# Patient Record
Sex: FEMALE | Race: WHITE | NOT HISPANIC OR LATINO | Employment: FULL TIME | URBAN - METROPOLITAN AREA
[De-identification: names, ages, dates, MRNs, and addresses within clinical notes are randomized per-mention and may not be internally consistent; named-entity substitution may affect disease eponyms.]

---

## 2019-07-24 ENCOUNTER — TRANSFERRED RECORDS (OUTPATIENT)
Dept: HEALTH INFORMATION MANAGEMENT | Facility: CLINIC | Age: 54
End: 2019-07-24

## 2019-07-24 LAB
HPV ABSTRACT: NORMAL
PAP-ABSTRACT: NORMAL

## 2019-10-24 ENCOUNTER — TRANSFERRED RECORDS (OUTPATIENT)
Dept: HEALTH INFORMATION MANAGEMENT | Facility: CLINIC | Age: 54
End: 2019-10-24

## 2019-10-24 LAB — COLOGUARD-ABSTRACT: NEGATIVE

## 2020-01-25 ENCOUNTER — TRANSFERRED RECORDS (OUTPATIENT)
Dept: HEALTH INFORMATION MANAGEMENT | Facility: CLINIC | Age: 55
End: 2020-01-25

## 2020-01-28 ENCOUNTER — TRANSFERRED RECORDS (OUTPATIENT)
Dept: HEALTH INFORMATION MANAGEMENT | Facility: CLINIC | Age: 55
End: 2020-01-28

## 2020-06-25 ENCOUNTER — OFFICE VISIT (OUTPATIENT)
Dept: FAMILY MEDICINE | Facility: CLINIC | Age: 55
End: 2020-06-25
Payer: COMMERCIAL

## 2020-06-25 ENCOUNTER — ANCILLARY PROCEDURE (OUTPATIENT)
Dept: GENERAL RADIOLOGY | Facility: CLINIC | Age: 55
End: 2020-06-25
Attending: FAMILY MEDICINE
Payer: COMMERCIAL

## 2020-06-25 VITALS
WEIGHT: 190.2 LBS | RESPIRATION RATE: 16 BRPM | BODY MASS INDEX: 31.69 KG/M2 | SYSTOLIC BLOOD PRESSURE: 122 MMHG | OXYGEN SATURATION: 95 % | TEMPERATURE: 98.6 F | HEART RATE: 65 BPM | HEIGHT: 65 IN | DIASTOLIC BLOOD PRESSURE: 82 MMHG

## 2020-06-25 DIAGNOSIS — R20.0 ARM NUMBNESS: Primary | ICD-10-CM

## 2020-06-25 DIAGNOSIS — R20.0 BILATERAL HAND NUMBNESS: ICD-10-CM

## 2020-06-25 PROBLEM — E03.8 OTHER SPECIFIED HYPOTHYROIDISM: Status: ACTIVE | Noted: 2020-06-25

## 2020-06-25 PROBLEM — Z13.6 CARDIOVASCULAR SCREENING; LDL GOAL LESS THAN 160: Status: ACTIVE | Noted: 2020-06-25

## 2020-06-25 PROCEDURE — 99203 OFFICE O/P NEW LOW 30 MIN: CPT | Performed by: FAMILY MEDICINE

## 2020-06-25 PROCEDURE — 72040 X-RAY EXAM NECK SPINE 2-3 VW: CPT | Mod: FY

## 2020-06-25 RX ORDER — LEVOTHYROXINE SODIUM 125 UG/1
112 TABLET ORAL DAILY
COMMUNITY
End: 2020-07-02 | Stop reason: DRUGHIGH

## 2020-06-25 ASSESSMENT — MIFFLIN-ST. JEOR: SCORE: 1464.65

## 2020-06-25 NOTE — PROGRESS NOTES
Subjective     Azul Dsouza is a 54 year old female who presents to clinic today for the following health issues:    HPI   Musculoskeletal problem/pain      Duration: x 2 months     Description  Location: left arm    Intensity:  moderate    Accompanying signs and symptoms: radiation of pain to hands- tingling and numbess    History  Previous similar problem: no   Previous evaluation:  none    Precipitating or alleviating factors:  Trauma or overuse: no   Aggravating factors include: while bending the back forward position will cause the hands to go numb.     Therapies tried and outcome: ice, Ibuprofen and NSAID - Naproxen        See under ROS    Patient Active Problem List   Diagnosis     Other specified hypothyroidism     CARDIOVASCULAR SCREENING; LDL GOAL LESS THAN 160       Current Outpatient Medications   Medication Sig Dispense Refill     levothyroxine (SYNTHROID/LEVOTHROID) 125 MCG tablet Take 125 mcg by mouth daily             Reviewed and updated as needed this visit by Provider         Review of Systems   CONSTITUTIONAL:NEGATIVE for fever, chills, change in weight  CV: NEGATIVE for chest pain, palpitations or peripheral edema  MUSCULOSKELETAL: see below  NEURO: see below  PSYCHIATRIC: NEGATIVE for changes in mood or affect        Thinking back or neck problem.  Leaning forward, left hand goes numb.  She first noted with weeding. Thought she might be pulling too hard. Started weeding with other hand.   Notes that there can be a shooting pain from elbow to hands; not more proximally.   A couple weeks ago, she went on a bike ride and notes was quite uncomfortable. Had to sit up to have it go away.   Is Icing and using Ibuprofen.    Did ice elbow, then neck after someone mentioned it might be from that.  Denies neck pain. Denies back pain.    Since end of March/early April.  Couple weeks ago was the bike ride.     She does note some relief with doing some exercises for tennis elbow.    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      Objective    /82 (BP Location: Right arm, Cuff Size: Adult Large)   Pulse 65   Temp 98.6  F (37  C) (Oral)   Resp 16   Ht 1.645 m (5' 4.75\")   Wt 86.3 kg (190 lb 3.2 oz)   SpO2 95%   BMI 31.90 kg/m    Body mass index is 31.9 kg/m .  Physical Exam   GENERAL APPEARANCE: alert and no distress  NECK: no adenopathy  MS: MS:left Elbow: Tender to palpation at lateral aspect. No pain with pronation, and with wrist extension.  She demonstrated leaning forward like tying shoe; that her hand goes numb. Did have her flex neck forward while seated, and she notes same thing happened. This does not happen with neck extension.  Radial pulse easily palpated.  PSYCH: mentation appears normal and affect normal/bright    Cervical x-ray:  FINDINGS: There is reversal of the cervical lordosis. There is loss of  disc space height at C4-5 and C5-6. Mild degenerative change in the  facet joints..                                               IMPRESSION: Multilevel degenerative changes.        Assessment & Plan     1. Arm numbness  Suspected that it may come from neck; she can demonstrate that it comes on with forward flexion of neck. However, not with extension, which would probably be more the position when she was biking.     She only has symptoms from the elbows distally.   Will refer for assistance in diagnosis and management. Discussed some options. Consider Medical spine or Neurology.  With some elbow pain; I do think she may have some lateral epicondylitis; but overall this is currently mild.   - XR Cervical Spine 2/3 Views; Future  - Orthopedic & Spine  Referral; Future    2. Bilateral hand numbness  As above.  - XR Cervical Spine 2/3 Views; Future  - Orthopedic & Spine  Referral; Future           Return in about 1 week (around 7/2/2020) for medical spine specialist..    Gladys Ramey MD, MD  [facility]          "

## 2020-07-02 ENCOUNTER — OFFICE VISIT (OUTPATIENT)
Dept: ORTHOPEDICS | Facility: CLINIC | Age: 55
End: 2020-07-02
Attending: FAMILY MEDICINE
Payer: COMMERCIAL

## 2020-07-02 VITALS
HEIGHT: 65 IN | DIASTOLIC BLOOD PRESSURE: 88 MMHG | BODY MASS INDEX: 31.65 KG/M2 | SYSTOLIC BLOOD PRESSURE: 128 MMHG | WEIGHT: 190 LBS

## 2020-07-02 DIAGNOSIS — M47.812 FACET ARTHROPATHY, CERVICAL: Primary | ICD-10-CM

## 2020-07-02 DIAGNOSIS — M50.30 DDD (DEGENERATIVE DISC DISEASE), CERVICAL: ICD-10-CM

## 2020-07-02 DIAGNOSIS — M54.12 LEFT CERVICAL RADICULOPATHY: ICD-10-CM

## 2020-07-02 PROCEDURE — 99203 OFFICE O/P NEW LOW 30 MIN: CPT | Performed by: FAMILY MEDICINE

## 2020-07-02 RX ORDER — LEVOTHYROXINE SODIUM 112 UG/1
112 TABLET ORAL DAILY
COMMUNITY
End: 2020-07-30

## 2020-07-02 ASSESSMENT — MIFFLIN-ST. JEOR: SCORE: 1463.74

## 2020-07-02 NOTE — PATIENT INSTRUCTIONS
1. Facet arthropathy, cervical    2. DDD (degenerative disc disease), cervical    3. Left cervical radiculopathy      Pain is coming from your neck - cervical radiculopathy  Reviewed xray - reversal of the normal curve, advanced disc narrowing and facet arthritis  Physical therapy: [facility] - [phone]    Follow-up if not improving after 3-4 therapy sessions

## 2020-07-02 NOTE — PROGRESS NOTES
ASSESSMENT & PLAN    1. Facet arthropathy, cervical    2. DDD (degenerative disc disease), cervical    3. Left cervical radiculopathy      Seen in consultation for acute left arm numbness related to cervical radiculopathy  Reviewed xray - reversal of the normal curve, advanced disc narrowing and facet arthritis  Physical therapy: [facility] - [phone]    Follow-up if not improving after 3-4 therapy sessions  -----    SUBJECTIVE  Azul Dsouza is a/an 54 year old Right handed female who is seen in consultation at the request of  Gladys Ramey M.D. for evaluation of numbness in left forearm. The patient is seen by themselves.    Onset: 2 month(s) ago. Reports insidious onset without acute precipitating event. Patient was doing a lot of weeding in her garden around the time that the pain began and thought it would resolve on its own but it has not.  Location of Pain: numbness / tingling in left elbow radiating to left hand; occasional pain in left lateral elbow; patient also notes some left superior shoulder pain   Rating of Pain at worst: 3/10 (discomfort)  Rating of Pain Currently: 0/10  Worsened by: leaning forward to tie shoes, sleeping on right or left side  Better with: laying on back, changing positions  Treatments tried: rest/activity avoidance, ice, ibuprofen, other medications: naproxen and previous imaging (xray 6/25/20 cervical spine)  Quality: numbness, tingling  Associated symptoms: numbness and tingling; denies weakness  Orthopedic history: NO  Relevant surgical history: NO  Patient Social History: works as a teacher    Patient's past medical, surgical, social, and family histories were reviewed today and no pertinent history related to patient's presenting problem.    REVIEW OF SYSTEMS:  10 point ROS is negative other than symptoms noted above in HPI, Past Medical History or as stated below  Constitutional: NEGATIVE for fever, chills, change in weight  Skin: NEGATIVE for  "worrisome rashes, moles or lesions  GI/: NEGATIVE for bowel or bladder changes  Neuro: NEGATIVE for weakness, dizziness or paresthesias    OBJECTIVE:  /88   Ht 1.645 m (5' 4.75\")   Wt 86.2 kg (190 lb)   BMI 31.86 kg/m     General: healthy, alert and in no distress  HEENT: no scleral icterus or conjunctival erythema  Skin: no suspicious lesions or rash. No jaundice.  CV: regular rhythm by palpation  Resp: normal respiratory effort without conversational dyspnea   Psych: normal mood and affect  Gait: normal steady gait with appropriate coordination and balance  Neuro: with cervical extension induces ulnar sided forearm and hand numbness, also medial forearm arm and middle deltoid.  Once that subsides she has normal light touch sensory exam of the bilateral upper extremities.  Normal 2 pt discrimination.  MSK:  CERVICAL SPINE  Inspection:    rounded shoulders, forward head posture  Palpation:    Tender about the medial border of scapula. Otherwise remainder of the landmarks and nontender.  Range of Motion:     Flexion full    Extension limited by pain and induces left cervical radic / numbness into left forearm  Strength:    biceps (C6) 5/5, wrist extension (C6) 5/5, triceps (C7) 5/5, wrist flexion (C7) 5/5, finger flexion (C8) 5/5, Normal pinch strength  Special Tests:    Positive: Spurling's (left), Tinel (Carpal Tunnel)    Negative: Tinel (Guyon's and Cubital)    Independent visualization of the below image:    Results for orders placed or performed in visit on 06/25/20   XR Cervical Spine 2/3 Views    Narrative    CERVICAL SPINE 2-3 VIEWS 6/25/2020 12:05 PM     HISTORY: numbness from elbows to hands left > right with bending neck  forward.; Bilateral hand numbness    COMPARISON: None.    FINDINGS: There is reversal of the cervical lordosis. There is loss of  disc space height at C4-5 and C5-6. Mild degenerative change in the  facet joints..      Impression    IMPRESSION: Multilevel degenerative " changes.    MD Fabian CATES, DO [facility]

## 2020-07-02 NOTE — LETTER
7/2/2020         RE: Azul Dsouza  [address] [facility] [address]        Dear Colleague,    Thank you for referring your patient, Azul Dsouza, to the [facility]. Please see a copy of my visit note below.    ASSESSMENT & PLAN    1. Facet arthropathy, cervical    2. DDD (degenerative disc disease), cervical    3. Left cervical radiculopathy      Seen in consultation for acute left arm numbness related to cervical radiculopathy  Reviewed xray - reversal of the normal curve, advanced disc narrowing and facet arthritis  Physical therapy: [facility] - [phone]    Follow-up if not improving after 3-4 therapy sessions  -----    SUBJECTIVE  Azul Dsouza is a/an 54 year old Right handed female who is seen in consultation at the request of  Gladys Ramey M.D. for evaluation of numbness in left forearm. The patient is seen by themselves.    Onset: 2 month(s) ago. Reports insidious onset without acute precipitating event. Patient was doing a lot of weeding in her garden around the time that the pain began and thought it would resolve on its own but it has not.  Location of Pain: numbness / tingling in left elbow radiating to left hand; occasional pain in left lateral elbow; patient also notes some left superior shoulder pain   Rating of Pain at worst: 3/10 (discomfort)  Rating of Pain Currently: 0/10  Worsened by: leaning forward to tie shoes, sleeping on right or left side  Better with: laying on back, changing positions  Treatments tried: rest/activity avoidance, ice, ibuprofen, other medications: naproxen and previous imaging (xray 6/25/20 cervical spine)  Quality: numbness, tingling  Associated symptoms: numbness and tingling; denies weakness  Orthopedic history: NO  Relevant surgical history: NO  Patient Social History: works as a teacher    Patient's past medical, surgical, social, and family histories were reviewed today and no pertinent history  "related to patient's presenting problem.    REVIEW OF SYSTEMS:  10 point ROS is negative other than symptoms noted above in HPI, Past Medical History or as stated below  Constitutional: NEGATIVE for fever, chills, change in weight  Skin: NEGATIVE for worrisome rashes, moles or lesions  GI/: NEGATIVE for bowel or bladder changes  Neuro: NEGATIVE for weakness, dizziness or paresthesias    OBJECTIVE:  /88   Ht 1.645 m (5' 4.75\")   Wt 86.2 kg (190 lb)   BMI 31.86 kg/m     General: healthy, alert and in no distress  HEENT: no scleral icterus or conjunctival erythema  Skin: no suspicious lesions or rash. No jaundice.  CV: regular rhythm by palpation  Resp: normal respiratory effort without conversational dyspnea   Psych: normal mood and affect  Gait: normal steady gait with appropriate coordination and balance  Neuro: with cervical extension induces ulnar sided forearm and hand numbness, also medial forearm arm and middle deltoid.  Once that subsides she has normal light touch sensory exam of the bilateral upper extremities.  Normal 2 pt discrimination.  MSK:  CERVICAL SPINE  Inspection:    rounded shoulders, forward head posture  Palpation:    Tender about the medial border of scapula. Otherwise remainder of the landmarks and nontender.  Range of Motion:     Flexion full    Extension limited by pain and induces left cervical radic / numbness into left forearm  Strength:    biceps (C6) 5/5, wrist extension (C6) 5/5, triceps (C7) 5/5, wrist flexion (C7) 5/5, finger flexion (C8) 5/5, Normal pinch strength  Special Tests:    Positive: Spurling's (left), Tinel (Carpal Tunnel)    Negative: Tinel (Guyon's and Cubital)    Independent visualization of the below image:    Results for orders placed or performed in visit on 06/25/20   XR Cervical Spine 2/3 Views    Narrative    CERVICAL SPINE 2-3 VIEWS 6/25/2020 12:05 PM     HISTORY: numbness from elbows to hands left > right with bending neck  forward.; Bilateral hand " numbness    COMPARISON: None.    FINDINGS: There is reversal of the cervical lordosis. There is loss of  disc space height at C4-5 and C5-6. Mild degenerative change in the  facet joints..      Impression    IMPRESSION: Multilevel degenerative changes.    MD Fabian CATES, DO [facility]        Again, thank you for allowing me to participate in the care of your patient.        Sincerely,        Fabian Hsieh, DO

## 2020-07-14 ENCOUNTER — THERAPY VISIT (OUTPATIENT)
Dept: PHYSICAL THERAPY | Facility: CLINIC | Age: 55
End: 2020-07-14
Attending: FAMILY MEDICINE
Payer: COMMERCIAL

## 2020-07-14 DIAGNOSIS — M50.30 DDD (DEGENERATIVE DISC DISEASE), CERVICAL: ICD-10-CM

## 2020-07-14 DIAGNOSIS — M54.12 CERVICAL RADICULOPATHY: ICD-10-CM

## 2020-07-14 DIAGNOSIS — M54.12 LEFT CERVICAL RADICULOPATHY: ICD-10-CM

## 2020-07-14 DIAGNOSIS — M47.812 FACET ARTHROPATHY, CERVICAL: ICD-10-CM

## 2020-07-14 PROCEDURE — 97530 THERAPEUTIC ACTIVITIES: CPT | Mod: GP | Performed by: PHYSICAL THERAPIST

## 2020-07-14 PROCEDURE — 97162 PT EVAL MOD COMPLEX 30 MIN: CPT | Mod: GP | Performed by: PHYSICAL THERAPIST

## 2020-07-14 PROCEDURE — 97110 THERAPEUTIC EXERCISES: CPT | Mod: GP | Performed by: PHYSICAL THERAPIST

## 2020-07-14 NOTE — PROGRESS NOTES
"[facility] Initial Evaluation -- Cervical    Evaluation Date: July 14, 2020  Azul Dsouza is a 54 year old female with a cervical condition.   Referral: Dr. Hsieh  Work mechanical stresses: teacher   Employment status: full time  Leisure mechanical stresses: gardening  Functional disability score (NDI):  See flow sheet  VAS score (0-10): 4/10  Patient goals/expectations:  I want to never have my arm fall asleep, ride my bike, garden, sleep on my sides vs back only.\"    HISTORY:    Present symptoms:  L scap, L arm numbness.  Pain quality (sharp/shooting/stabbing/aching/burning/cramping):   aching.  Paresthesia (yes/no):  Yes, bilateral hands, L UE    Present since (onset date): March 2020.     Symptoms (improving/unchanging/worsening):  worsening.    Symptoms commenced as a result of: no apparent   Condition occurred in the following environment:  home    Symptoms at onset (neck/arm/forearm/headache): L arm,   Constant symptoms (neck/arm/forearm/headache):   Intermittent symptoms (neck/arm/forearm/headache): L arm, neck    Symptoms are made worse with the following: Always Bending, Sometimes Turning, Sometimes Lying and time of day - Always as the day progresses   Symptoms are made better with the following: Always Lying, time of day - Always AM and ice, [place], sitting back or tall.    Disturbed sleep (yes/no): only if rolling onto side  Number of pillows: 1  Sleeping postures (prone/sup/side R/L): supine    Previous episodes (0/1-5/6-10/11+): 0 Year of first episode: 2020, March    Previous history: none previous  Previous treatments: none    Specific Questions: (as reported by the patient)  Dizziness/Tinnitus/Nausea/Swallowing (pos/neg): neg  Gait/Upper Limbs (normal/abnormal): normal  Medications (nil/NSAIDS/anlag/steroids/anticoag/other):  NSAIDS and Other - Thyroid  Medical allergies:  Sulfa  General health (excellent/good/fair/poor):  excellent  Pertinent medical history:  Thyroid " problems  Imaging (None/Xray/MRI/Other):  xrays  Recent or major surgery (yes/no): no  Night pain (yes/no): no  Accidents (yes/no): none  Unexplained weight loss (yes/no): none  Barriers at home: none  Other red flags: none    EXAMINATION    Posture:   Sitting (good/fair/poor): good  Standing (good/fair/poor): good     Protruded head (yes/no): no    Wry Neck (right/left/nil):  nil  Relevant (yes/no):  none     Correction of posture(better/worse/no effect): no effect  Other observations:  none    Neurological:    Motor Deficit:  none   Reflexes:  intact  Sensory Deficit:  none   Dural signs:  +ULNTT    Movement Loss:   Parvez Mod Min Nil Pain   Protrusion    x    Flexion  x x  pdm neck   Retraction  x x  P L UE NT   Extension  x x  P L UE NT   Lateral flexion R  x      Lateral flexion L  x      Rotation R   x     Rotation L   x       Test Movements:   During: produces, abolishes, increases, decreases, no effect, centralizing, peripheralizing  After: better, worse, no better, no worse, no effect, centralized, peripheralized    Pretest symptoms sitting: painfree   Symptoms During Symptoms After ROM increased ROM decreased No Effect   PRO        Rep PRO        RET Produces  Peripheralising    No Worse         Rep RET Produces  Peripheralising    Worse      x   RET EXT        Rep RET EXT        Pretest symptoms lying:  painfree   Symptoms During Symptoms After ROM increased ROM decreased No Effect   RET NE   NE      Rep RET No Effect    No Effect    X, better L ULNTT     RET EXT        Rep RET EXT        If required, pretest symptoms sitting:     Symptoms During Symptoms After ROM increased ROM decreased No Effect   LF-R        Rep LF-R        LF-L        Rep LF-L        ROT-R        Rep ROT-R        ROT-L        Rep ROT-L        FLEX        Rep FLEX            Static Tests:   Protrusion:    Flexion:    Retraction:    Extension (sitting/prone/supine):      Other Tests:     Provisional Classification:  Derangement -  Asymmetrical, unilateral, symptoms below elbow    Principle of Management:  Education:  Centralization, therapeutic dose of exercise, traffic light    Equipment provided:  Lumbar roll  Mechanical therapy (Y/N):  Y   Extension principle:  Rep RET in supine x 10/2 hrs   Lateral principle:    Flexion principle:     Other:      ASSESSMENT/PLAN:    Patient is a 54 year old female with cervical complaints.    Patient has the following significant findings with corresponding treatment plan.                Diagnosis 1:  Cervical radiculopathy  Pain -  manual therapy, self management, education, directional preference exercise and home program  Decreased ROM/flexibility - manual therapy and therapeutic exercise  Decreased function - therapeutic activities  Impaired posture - neuro re-education    Therapy Evaluation Codes:   1) History comprised of:   Personal factors that impact the plan of care:      None.    Comorbidity factors that impact the plan of care are:      Overweight.     Medications impacting care: Anti-inflammatory.  2) Examination of Body Systems comprised of:   Body structures and functions that impact the plan of care:      Cervical spine.   Activity limitations that impact the plan of care are:      Bending, Cooking, Working and Sleeping.  3) Clinical presentation characteristics are:   Evolving/Changing.  4) Decision-Making    Moderate complexity using standardized patient assessment instrument and/or measureable assessment of functional outcome.  Cumulative Therapy Evaluation is: Moderate complexity.    Previous and current functional limitations:  (See Goal Flow Sheet for this information)    Short term and Long term goals: (See Goal Flow Sheet for this information)     Communication ability:  Patient appears to be able to clearly communicate and understand verbal and written communication and follow directions correctly.  Treatment Explanation - The following has been discussed with the patient:   RX  ordered/plan of care  Anticipated outcomes  Possible risks and side effects  This patient would benefit from PT intervention to resume normal activities.   Rehab potential is good.    Frequency:  1 X week, once daily  Duration:  for 6 weeks  Discharge Plan:  Achieve all LTG.  Independent in home treatment program.  Reach maximal therapeutic benefit.    Please refer to the daily flowsheet for treatment today, total treatment time and time spent performing 1:1 timed codes.

## 2020-07-21 ENCOUNTER — THERAPY VISIT (OUTPATIENT)
Dept: PHYSICAL THERAPY | Facility: CLINIC | Age: 55
End: 2020-07-21
Payer: COMMERCIAL

## 2020-07-21 DIAGNOSIS — M54.12 CERVICAL RADICULOPATHY: ICD-10-CM

## 2020-07-21 PROCEDURE — 97110 THERAPEUTIC EXERCISES: CPT | Mod: GP | Performed by: PHYSICAL THERAPIST

## 2020-07-21 PROCEDURE — 97140 MANUAL THERAPY 1/> REGIONS: CPT | Mod: GP | Performed by: PHYSICAL THERAPIST

## 2020-07-23 ENCOUNTER — THERAPY VISIT (OUTPATIENT)
Dept: PHYSICAL THERAPY | Facility: CLINIC | Age: 55
End: 2020-07-23
Payer: COMMERCIAL

## 2020-07-23 DIAGNOSIS — M54.12 CERVICAL RADICULOPATHY: ICD-10-CM

## 2020-07-23 PROCEDURE — 97110 THERAPEUTIC EXERCISES: CPT | Mod: GP | Performed by: PHYSICAL THERAPIST

## 2020-07-23 PROCEDURE — 97530 THERAPEUTIC ACTIVITIES: CPT | Mod: GP | Performed by: PHYSICAL THERAPIST

## 2020-07-27 ENCOUNTER — DOCUMENTATION ONLY (OUTPATIENT)
Dept: FAMILY MEDICINE | Facility: CLINIC | Age: 55
End: 2020-07-27

## 2020-07-27 NOTE — PROGRESS NOTES
Recd records from [facility] 07/27/20 and forwarded to Gladys Ramey for review and scanning

## 2020-07-28 NOTE — PROGRESS NOTES
"We did receive some records and they will be sent to abstraction  cologuard specimen negative 10/24/2019  Mammogram 10/24/2019 negative   Pap and HPV negative  7/24/2019  TSH 0.761  T4 1.18  7/24/2019    Azul Dsouza is a 54 year old female who is being evaluated via a billable telephone visit.      Telephone visit  [phone]  Previously at [facility]    The patient has been notified of following:     \"This telephone visit will be conducted via a call between you and your physician/provider. We have found that certain health care needs can be provided without the need for a physical exam.  This service lets us provide the care you need with a short phone conversation.  If a prescription is necessary we can send it directly to your pharmacy.  If lab work is needed we can place an order for that and you can then stop by our lab to have the test done at a later time.    Telephone visits are billed at different rates depending on your insurance coverage. During this emergency period, for some insurers they may be billed the same as an in-person visit.  Please reach out to your insurance provider with any questions.    If during the course of the call the physician/provider feels a telephone visit is not appropriate, you will not be charged for this service.\"    Patient has given verbal consent for Telephone visit?  Yes    What phone number would you like to be contacted at? [phone]    How would you like to obtain your AVS? Chaparrita Pack     Azul Dsouza is a 54 year old female who presents via phone visit today for the following health issues:    HPI    Hypothyroidism Follow-up    ( Pt states her previous provider has given her a 30 day extension on her thyroid medication and so she will be doing labs on Monday  afternoon in [place] and if you would like to wait for the labs to come back prior to refilling she is fine with that)    #7 of 12   Mother also on thyroid medications  On meds for 10 + " years.      Since last visit, patient describes the following symptoms: Weight stable, no hair loss, no skin changes, no constipation, no loose stools      How many servings of fruits and vegetables do you eat daily?  4 or more    On average, how many sweetened beverages do you drink each day (Examples: soda, juice, sweet tea, etc.  Do NOT count diet or artificially sweetened beverages)?   0    How many days per week do you exercise enough to make your heart beat faster? 5    How many minutes a day do you exercise enough to make your heart beat faster? 20 - 29    How many days per week do you miss taking your medication? 0           Patient Active Problem List   Diagnosis     Other specified hypothyroidism     CARDIOVASCULAR SCREENING; LDL GOAL LESS THAN 160     Cervical radiculopathy     Past Surgical History:   Procedure Laterality Date      SECTION      x2     LAPAROSCOPIC TUBAL LIGATION      No Comments Provided       Social History     Tobacco Use     Smoking status: Never Smoker     Smokeless tobacco: Never Used   Substance Use Topics     Alcohol use: Yes     Family History   Problem Relation Age of Onset     Family History Negative Mother         Good Health     Family History Negative Father         Good Health     Other - See Comments Father         depression     Other - See Comments Sister         depression     Other - See Comments Brother         Psychiatric illness,bipolar     Cancer Maternal Grandmother         Cancer,Skin cancer/ bone cancer.     Cancer Maternal Grandfather         Cancer,Lung cancer.     Diabetes Paternal Grandmother         Diabetes     Breast Cancer No family hx of         Cancer-breast     Colon Cancer No family hx of         Cancer-colon         Current Outpatient Medications   Medication Sig Dispense Refill     levothyroxine (SYNTHROID/LEVOTHROID) 112 MCG tablet Take 112 mcg by mouth daily       PULMICORT FLEXHALER 180 MCG/ACT inhaler Inhale 1 puff into the lungs 2  times daily as needed       Allergies   Allergen Reactions     Sulfa Drugs Hives     No lab results found.   BP Readings from Last 3 Encounters:   07/02/20 128/88   06/25/20 122/82    Wt Readings from Last 3 Encounters:   07/02/20 86.2 kg (190 lb)   06/25/20 86.3 kg (190 lb 3.2 oz)           Reviewed and updated as needed this visit by Provider  Tobacco  Allergies  Meds  Problems  Med Hx  Surg Hx  Fam Hx         Review of Systems   CONSTITUTIONAL: NEGATIVE for fever, chills, change in weight  ENT/MOUTH: NEGATIVE for ear, mouth and throat problems  RESP: NEGATIVE for significant cough or SOB  CV: NEGATIVE for chest pain, palpitations or peripheral edema  GI: NEGATIVE for nausea, abdominal pain, heartburn, or change in bowel habits  MUSCULOSKELETAL: NEGATIVE for significant arthralgias or myalgia  NEURO: NEGATIVE for weakness, dizziness or paresthesias  ENDOCRINE: thyroid, clinically euthyroid; due for labs  PSYCHIATRIC: NEGATIVE for changes in mood or affect       Objective   Reported vitals:  There were no vitals taken for this visit.   healthy, alert and no distress  PSYCH: Alert and oriented times 3; coherent speech, normal   rate and volume, able to articulate logical thoughts, able   to abstract reason, no tangential thoughts, no hallucinations   or delusions  Her affect is normal and pleasant  RESP: No cough, no audible wheezing, able to talk in full sentences  Remainder of exam unable to be completed due to telephone visits    Diagnostic Test Results:  Labs reviewed in Epic        Assessment/Plan:    (E03.9) Acquired hypothyroidism  (primary encounter diagnosis)  Comment: 10 + years; strong family history of hypothyroid; mother- thyroid; one of 7 ( of 12 siblings) on thyroid meds; feeling well on Levothyroxine 112 mcg per day.   Clinically euthyroid; labs today, consider dose adjustment if labs warrant  Sent Rx. Will need new Rx if labs warrant a change;   Plan: levothyroxine (SYNTHROID/LEVOTHROID) 112  MCG         tablet, TSH with free T4 reflex, Comprehensive         metabolic panel          (Z13.6) CARDIOVASCULAR SCREENING; LDL GOAL LESS THAN 130  Comment: screening.   Plan: Lipid panel reflex to direct LDL Fasting              Return in about 1 year (around 7/30/2021) for Thyroid; physical and review HCM in the interim. .      Phone call duration:  17 minutes    Kim Clancy MD  Internal Medicine  electronically signed

## 2020-07-30 ENCOUNTER — VIRTUAL VISIT (OUTPATIENT)
Dept: FAMILY MEDICINE | Facility: CLINIC | Age: 55
End: 2020-07-30
Payer: COMMERCIAL

## 2020-07-30 DIAGNOSIS — Z13.6 CARDIOVASCULAR SCREENING; LDL GOAL LESS THAN 130: ICD-10-CM

## 2020-07-30 DIAGNOSIS — E03.9 ACQUIRED HYPOTHYROIDISM: Primary | ICD-10-CM

## 2020-07-30 PROCEDURE — 99213 OFFICE O/P EST LOW 20 MIN: CPT | Mod: TEL | Performed by: INTERNAL MEDICINE

## 2020-07-30 RX ORDER — LEVOTHYROXINE SODIUM 112 UG/1
112 TABLET ORAL DAILY
Qty: 90 TABLET | Refills: 3 | Status: SHIPPED | OUTPATIENT
Start: 2020-07-30 | End: 2021-08-03

## 2020-07-30 RX ORDER — BUDESONIDE 180 UG/1
1 AEROSOL, POWDER RESPIRATORY (INHALATION) 2 TIMES DAILY PRN
COMMUNITY
Start: 2020-03-18

## 2020-08-05 ENCOUNTER — THERAPY VISIT (OUTPATIENT)
Dept: PHYSICAL THERAPY | Facility: CLINIC | Age: 55
End: 2020-08-05
Payer: COMMERCIAL

## 2020-08-05 DIAGNOSIS — Z13.6 CARDIOVASCULAR SCREENING; LDL GOAL LESS THAN 130: ICD-10-CM

## 2020-08-05 DIAGNOSIS — M54.12 CERVICAL RADICULOPATHY: ICD-10-CM

## 2020-08-05 DIAGNOSIS — E03.9 ACQUIRED HYPOTHYROIDISM: ICD-10-CM

## 2020-08-05 PROCEDURE — 80053 COMPREHEN METABOLIC PANEL: CPT | Performed by: INTERNAL MEDICINE

## 2020-08-05 PROCEDURE — 80061 LIPID PANEL: CPT | Performed by: INTERNAL MEDICINE

## 2020-08-05 PROCEDURE — 36415 COLL VENOUS BLD VENIPUNCTURE: CPT | Performed by: INTERNAL MEDICINE

## 2020-08-05 PROCEDURE — 97140 MANUAL THERAPY 1/> REGIONS: CPT | Mod: GP | Performed by: PHYSICAL THERAPIST

## 2020-08-05 PROCEDURE — 84443 ASSAY THYROID STIM HORMONE: CPT | Performed by: INTERNAL MEDICINE

## 2020-08-05 NOTE — PROGRESS NOTES
DISCHARGE REPORT    Progress reporting period is from 7-14-20 to 8-5-20.       SUBJECTIVE  Subjective changes noted by patient:  .  Subjective: Worse having increased L arm pain, less able to find a comfortable position to alleviate arm pain now.    Current pain level is 6/10  .     Previous pain level was  4/10 Initial Pain level: 4/10.   Changes in function:  None  Adverse reaction to treatment or activity: treatment - increasing L arm pain    OBJECTIVE  Changes noted in objective findings:  The objective findings below are from DOS 8-5-20.  Objective: No DP established, needs further invasive measures; referred back to provider.      ASSESSMENT/PLAN  Updated problem list and treatment plan: Diagnosis 1:  Cervical radiculopathy  Pain -  manual therapy, self management, education, directional preference exercise and home program  Decreased ROM/flexibility - manual therapy and therapeutic exercise  Decreased joint mobility - manual therapy and therapeutic exercise  Decreased function - therapeutic activities  Impaired posture - neuro re-education  STG/LTGs have been met or progress has been made towards goals:  None  Assessment of Progress: The patient's condition is unchanged.  The patient's condition has exacerbated.  Self Management Plans:  Patient is independent in a home treatment program.  Patient is independent in self management of symptoms.  I have re-evaluated this patient and find that the nature, scope, duration and intensity of the therapy is appropriate for the medical condition of the patient.  Azul continues to require the following intervention to meet STG and LTG's:  PT intervention is no longer required to meet STG/LTG.    Recommendations:  This patient would benefit from further evaluation.    Please refer to the daily flowsheet for treatment today, total treatment time and time spent performing 1:1 timed codes.

## 2020-08-06 LAB
ALBUMIN SERPL-MCNC: 4.1 G/DL (ref 3.4–5)
ALP SERPL-CCNC: 72 U/L (ref 40–150)
ALT SERPL W P-5'-P-CCNC: 12 U/L (ref 0–50)
ANION GAP SERPL CALCULATED.3IONS-SCNC: 5 MMOL/L (ref 3–14)
AST SERPL W P-5'-P-CCNC: 17 U/L (ref 0–45)
BILIRUB SERPL-MCNC: 0.6 MG/DL (ref 0.2–1.3)
BUN SERPL-MCNC: 10 MG/DL (ref 7–30)
CALCIUM SERPL-MCNC: 8.5 MG/DL (ref 8.5–10.1)
CHLORIDE SERPL-SCNC: 106 MMOL/L (ref 94–109)
CHOLEST SERPL-MCNC: 146 MG/DL
CO2 SERPL-SCNC: 28 MMOL/L (ref 20–32)
CREAT SERPL-MCNC: 0.8 MG/DL (ref 0.52–1.04)
GFR SERPL CREATININE-BSD FRML MDRD: 83 ML/MIN/{1.73_M2}
GLUCOSE SERPL-MCNC: 93 MG/DL (ref 70–99)
HDLC SERPL-MCNC: 68 MG/DL
LDLC SERPL CALC-MCNC: 66 MG/DL
NONHDLC SERPL-MCNC: 78 MG/DL
POTASSIUM SERPL-SCNC: 4 MMOL/L (ref 3.4–5.3)
PROT SERPL-MCNC: 7.8 G/DL (ref 6.8–8.8)
SODIUM SERPL-SCNC: 139 MMOL/L (ref 133–144)
TRIGL SERPL-MCNC: 60 MG/DL
TSH SERPL DL<=0.005 MIU/L-ACNC: 2.15 MU/L (ref 0.4–4)

## 2020-08-17 ENCOUNTER — VIRTUAL VISIT (OUTPATIENT)
Dept: ORTHOPEDICS | Facility: CLINIC | Age: 55
End: 2020-08-17
Payer: COMMERCIAL

## 2020-08-17 DIAGNOSIS — M47.812 FACET ARTHROPATHY, CERVICAL: Primary | ICD-10-CM

## 2020-08-17 DIAGNOSIS — M54.12 LEFT CERVICAL RADICULOPATHY: ICD-10-CM

## 2020-08-17 DIAGNOSIS — M50.30 DDD (DEGENERATIVE DISC DISEASE), CERVICAL: ICD-10-CM

## 2020-08-17 PROCEDURE — 99214 OFFICE O/P EST MOD 30 MIN: CPT | Mod: TEL | Performed by: FAMILY MEDICINE

## 2020-08-17 NOTE — LETTER
"    8/17/2020         RE: Azul Dsouza  [address] [facility] [address]        Dear Colleague,    Thank you for referring your patient, Azul Dsouza, to the [facility]. Please see a copy of my visit note below.    Azul Dsouza is a 54 year old female who is being evaluated via a billable telephone visit.      The patient has been notified of following:     \"This telephone visit will be conducted via a call between you and your physician/provider. We have found that certain health care needs can be provided without the need for a physical exam.  This service lets us provide the care you need with a short phone conversation.  If a prescription is necessary we can send it directly to your pharmacy.  If lab work is needed we can place an order for that and you can then stop by our lab to have the test done at a later time.    Telephone visits are billed at different rates depending on your insurance coverage. During this emergency period, for some insurers they may be billed the same as an in-person visit.  Please reach out to your insurance provider with any questions.    If during the course of the call the physician/provider feels a telephone visit is not appropriate, you will not be charged for this service.\"    Patient has given verbal consent for Telephone visit?  Yes    What phone number would you like to be contacted at? [phone]    How would you like to obtain your AVS? Chaparrita    Phone call duration: 15 minutes    Azul Dsouza is following up for left forearm numbness.   Since their last visit reports minimal improvement with structured physical therapy. They indicate that their current pain level is 3/10. They have tried ibuprofen and physical therapy (4 visits).      I have reviewed and updated the patient's Past Medical History, Social History, Family History and Medication List.    ALLERGIES  Sulfa drugs    REVIEW OF SYSTEMS:  10 point ROS is negative " other than symptoms noted above in HPI, Past Medical History or as stated below  Constitutional: NEGATIVE for fever, chills, change in weight  Skin: NEGATIVE for worrisome rashes, moles or lesions  GI/: NEGATIVE for bowel or bladder changes  Neuro: NEGATIVE for weakness, dizziness or paresthesias    Assessment/Plan:    1. Facet arthropathy, cervical    2. DDD (degenerative disc disease), cervical    3. Left cervical radiculopathy      Discussion with MDT therapist - patient has not had sustained improvement with structure spine based therapy.  MRI of your neck has been ordered.Schedule with [facility] ([phone]). Once you know the date of your MRI, please call my office and schedule a follow-up visit for 2 days after that.  Xrays obtained and reviewed: yes  Tried NSAIDs: yes  6 weeks of physician directed physical therapy completed within the last 4 months: no, 4 weeks completed and don't anticipate any improvement with additional therapy. Has demonstrated no directional preference with therapy.    Fabian Hsieh DO, CAQSM  [facility]          Again, thank you for allowing me to participate in the care of your patient.        Sincerely,        Fabian Hsieh DO

## 2020-08-17 NOTE — PROGRESS NOTES
"Azul Dsouza is a 54 year old female who is being evaluated via a billable telephone visit.      The patient has been notified of following:     \"This telephone visit will be conducted via a call between you and your physician/provider. We have found that certain health care needs can be provided without the need for a physical exam.  This service lets us provide the care you need with a short phone conversation.  If a prescription is necessary we can send it directly to your pharmacy.  If lab work is needed we can place an order for that and you can then stop by our lab to have the test done at a later time.    Telephone visits are billed at different rates depending on your insurance coverage. During this emergency period, for some insurers they may be billed the same as an in-person visit.  Please reach out to your insurance provider with any questions.    If during the course of the call the physician/provider feels a telephone visit is not appropriate, you will not be charged for this service.\"    Patient has given verbal consent for Telephone visit?  Yes    What phone number would you like to be contacted at? [phone]    How would you like to obtain your AVS? Chaparrita    Phone call duration: 15 minutes    Azul Dsouza is following up for left forearm numbness.   Since their last visit reports minimal improvement with structured physical therapy. They indicate that their current pain level is 3/10. They have tried ibuprofen and physical therapy (4 visits).      I have reviewed and updated the patient's Past Medical History, Social History, Family History and Medication List.    ALLERGIES  Sulfa drugs    REVIEW OF SYSTEMS:  10 point ROS is negative other than symptoms noted above in HPI, Past Medical History or as stated below  Constitutional: NEGATIVE for fever, chills, change in weight  Skin: NEGATIVE for worrisome rashes, moles or lesions  GI/: NEGATIVE for bowel or bladder changes  Neuro: " NEGATIVE for weakness, dizziness or paresthesias    Assessment/Plan:    1. Facet arthropathy, cervical    2. DDD (degenerative disc disease), cervical    3. Left cervical radiculopathy      Discussion with MDT therapist - patient has not had sustained improvement with structure spine based therapy.  MRI of your neck has been ordered.Schedule with [facility] ([phone]). Once you know the date of your MRI, please call my office and schedule a follow-up visit for 2 days after that.  Xrays obtained and reviewed: yes  Tried NSAIDs: yes  6 weeks of physician directed physical therapy completed within the last 4 months: no, 4 weeks completed and don't anticipate any improvement with additional therapy. Has demonstrated no directional preference with therapy.    Fabian Hsieh DO, CAQSM  [facility]

## 2020-08-17 NOTE — PATIENT INSTRUCTIONS
1. Facet arthropathy, cervical    2. DDD (degenerative disc disease), cervical    3. Left cervical radiculopathy      MRI of your neck has been ordered.Schedule with [facility] ([phone]). Once you know the date of your MRI, please call my office and schedule a follow-up visit for 2 days after that.  Continue with Ibuprofen

## 2020-09-03 ENCOUNTER — HOSPITAL ENCOUNTER (OUTPATIENT)
Dept: MRI IMAGING | Facility: CLINIC | Age: 55
Discharge: HOME | End: 2020-09-03
Attending: FAMILY MEDICINE | Admitting: FAMILY MEDICINE
Payer: COMMERCIAL

## 2020-09-03 DIAGNOSIS — M47.812 FACET ARTHROPATHY, CERVICAL: ICD-10-CM

## 2020-09-03 DIAGNOSIS — M50.30 DDD (DEGENERATIVE DISC DISEASE), CERVICAL: ICD-10-CM

## 2020-09-03 DIAGNOSIS — M54.12 LEFT CERVICAL RADICULOPATHY: ICD-10-CM

## 2020-09-03 PROCEDURE — 72141 MRI NECK SPINE W/O DYE: CPT

## 2020-09-05 ENCOUNTER — VIRTUAL VISIT (OUTPATIENT)
Dept: ORTHOPEDICS | Facility: CLINIC | Age: 55
End: 2020-09-05
Payer: COMMERCIAL

## 2020-09-05 DIAGNOSIS — M50.20 HERNIATED DISC, CERVICAL: ICD-10-CM

## 2020-09-05 DIAGNOSIS — M48.02 FORAMINAL STENOSIS OF CERVICAL REGION: ICD-10-CM

## 2020-09-05 DIAGNOSIS — M54.12 LEFT CERVICAL RADICULOPATHY: ICD-10-CM

## 2020-09-05 DIAGNOSIS — M47.812 FACET ARTHROPATHY, CERVICAL: ICD-10-CM

## 2020-09-05 DIAGNOSIS — R90.89 ABNORMAL MRI, SPINAL CORD: Primary | ICD-10-CM

## 2020-09-05 DIAGNOSIS — M48.00 CENTRAL SPINAL STENOSIS: ICD-10-CM

## 2020-09-05 DIAGNOSIS — M50.30 DDD (DEGENERATIVE DISC DISEASE), CERVICAL: ICD-10-CM

## 2020-09-05 PROCEDURE — 99214 OFFICE O/P EST MOD 30 MIN: CPT | Mod: TEL | Performed by: FAMILY MEDICINE

## 2020-09-05 NOTE — LETTER
"    9/5/2020         RE: Azul Dsouza  [address] [facility] [address]        Dear Colleague,    Thank you for referring your patient, Azul Dsouza, to the [facility]. Please see a copy of my visit note below.    Azul Dsouza is a 54 year old female who is being evaluated via a billable telephone visit.      The patient has been notified of following:     \"This telephone visit will be conducted via a call between you and your physician/provider. We have found that certain health care needs can be provided without the need for a physical exam.  This service lets us provide the care you need with a short phone conversation.  If a prescription is necessary we can send it directly to your pharmacy.  If lab work is needed we can place an order for that and you can then stop by our lab to have the test done at a later time.    Telephone visits are billed at different rates depending on your insurance coverage. During this emergency period, for some insurers they may be billed the same as an in-person visit.  Please reach out to your insurance provider with any questions.    If during the course of the call the physician/provider feels a telephone visit is not appropriate, you will not be charged for this service.\"    Patient has given verbal consent for Telephone visit?  Yes    What phone number would you like to be contacted at? [phone]    How would you like to obtain your AVS? Chaparrita    Phone call duration: 10 minutes    Azul Dsouza is following up for left forearm numbness and review of MRI results.    I have reviewed and updated the patient's Past Medical History, Social History, Family History and Medication List.    ALLERGIES  Sulfa drugs    REVIEW OF SYSTEMS:  10 point ROS is negative other than symptoms noted above in HPI, Past Medical History or as stated below  Constitutional: NEGATIVE for fever, chills, change in weight  Skin: NEGATIVE for worrisome " rashes, moles or lesions  GI/: NEGATIVE for bowel or bladder changes  Neuro: NEGATIVE for weakness, dizziness or paresthesias    Imaging  MRI OF THE CERVICAL SPINE WITHOUT CONTRAST  9/3/2020 6:36 PM     COMPARISON: None.     HISTORY: Assess for leftward disc. Facet arthropathy, cervical. DDD  (degenerative disc disease), cervical. Left cervical radiculopathy.     TECHNIQUE: Multiplanar, multisequence MRI images of the cervical spine  were acquired without intravenous contrast.     FINDINGS: There is normal alignment of the cervical vertebrae;  however, there is reversal of normal cervical lordosis centered at the  C4-C5 level. Vertebral body heights of the cervical spine are normal.  Marrow signal throughout the cervical vertebrae is within normal  limits. There is no evidence for fracture or pathologic bony lesion of  the cervical spine.      There is loss of disc height, disc desiccation and posterior disc  bulging to varying degrees at all levels of the cervical spine.       There is abnormal T2 signal hyperintensity in the left lateral aspect  of the cervical spinal cord from the lower C3 level to the level of  the C3-C4 disc that is nonspecific. This signal abnormality could be  due to demyelinating disease but could also represent previous cord  trauma or infection. Clinical correlation recommended. The cervical  spinal cord is otherwise normal in contour and signal intensity.     C2-C3: There is a small posterior central disc herniation  (protrusion). Mild facet arthropathy bilaterally. There is no spinal  canal or neural foraminal stenosis at this level.     C3-C4: There is a minimal posterior broad-based disc osteophyte  complex. Mild facet arthropathy bilaterally. There is no spinal canal  or neural foraminal stenosis at this level.     C4-C5: There is facet arthropathy bilaterally, uncinate hypertrophy  bilaterally and a posterior broad-based disc-osteophyte complex.  Minimal spinal canal narrowing.  Moderate right foraminal stenosis.  Mild left foraminal narrowing.     C5-C6: There is facet arthropathy bilaterally, uncinate hypertrophy  bilaterally and a posterior broad-based disc-osteophyte complex.  Moderate spinal canal stenosis. Moderate bilateral neural foraminal  stenosis.     C6-C7: There is facet arthropathy bilaterally, uncinate hypertrophy  bilaterally and a posterior broad-based disc-osteophyte complex. Mild  spinal canal narrowing. Moderate left foraminal stenosis. Mild right  foraminal narrowing.     C7-T1: Minimal loss of disc height and disc desiccation. Mild facet  arthropathy bilaterally. There is no spinal canal or neural foraminal  stenosis at this level.                                                                      IMPRESSION:  1. Diffuse degenerative changes of the cervical spine as detailed  above.  2. Focus of abnormal T2 signal hyperintensity in the left lateral  aspect of the cervical spinal cord at the lower C3 and C3-C4 level  that is of uncertain significance. Differential diagnosis includes  demyelinating disease, previous infection or previous trauma. While  spinal cord malignancy is considered much less likely, tumor cannot be  completely excluded. Postcontrast MRI images of the cervical spine  recommended to exclude active pathologic process.  3. Moderate spinal canal stenosis at the C5-C6 level. No other  significant spinal canal narrowing of the cervical spine.  4. Moderate to severe neural foraminal stenosis on the right at C4-C5,  bilaterally at C5-C6 and on the left at C6-C7.     NICK GALLEGOS MD    Assessment/Plan:  Patient Instructions     1. Abnormal MRI, spinal cord    2. Facet arthropathy, cervical    3. DDD (degenerative disc disease), cervical    4. Left cervical radiculopathy    5. Herniated disc, cervical    6. Central spinal stenosis    7. Foraminal stenosis of cervical region      Reviewed MRI which shows narrowing in the central canal and around the  nerves This explains the numbness  The MRI also shows a small area of increased signal in the spinal cord that requires additional imaging to further understand  MRI with contrast of your neck has been ordered. Schedule with  [facility] ([phone]).    Let's keep your current follow-up visit on 9/10 and will review results of this additional MRI at that time.    Fabian Hsieh DO, CAM  [facility]      Again, thank you for allowing me to participate in the care of your patient.        Sincerely,        Fabian Hsieh DO

## 2020-09-05 NOTE — PATIENT INSTRUCTIONS
1. Abnormal MRI, spinal cord    2. Facet arthropathy, cervical    3. DDD (degenerative disc disease), cervical    4. Left cervical radiculopathy    5. Herniated disc, cervical    6. Central spinal stenosis    7. Foraminal stenosis of cervical region      Reviewed MRI which shows narrowing in the central canal and around the nerves that feed your arm. This explains the numbness you're having  The MRI also shows a small area of increased signal in your spinal cord that requires additional imaging to further understand  MRI with contrast of your neck has been ordered. Schedule with  [facility] ([phone]).    Let's keep your current follow-up visit on 9/10 and will review results of this additional MRI at that time.

## 2020-09-05 NOTE — PROGRESS NOTES
"Azul Dsouza is a 54 year old female who is being evaluated via a billable telephone visit.      The patient has been notified of following:     \"This telephone visit will be conducted via a call between you and your physician/provider. We have found that certain health care needs can be provided without the need for a physical exam.  This service lets us provide the care you need with a short phone conversation.  If a prescription is necessary we can send it directly to your pharmacy.  If lab work is needed we can place an order for that and you can then stop by our lab to have the test done at a later time.    Telephone visits are billed at different rates depending on your insurance coverage. During this emergency period, for some insurers they may be billed the same as an in-person visit.  Please reach out to your insurance provider with any questions.    If during the course of the call the physician/provider feels a telephone visit is not appropriate, you will not be charged for this service.\"    Patient has given verbal consent for Telephone visit?  Yes    What phone number would you like to be contacted at? [phone]    [facility]    Phone call duration: 10 minutes    Azul Dsouza is following up for left forearm numbness and review of MRI results.    I have reviewed and updated the patient's Past Medical History, Social History, Family History and Medication List.    ALLERGIES  Sulfa drugs    REVIEW OF SYSTEMS:  10 point ROS is negative other than symptoms noted above in HPI, Past Medical History or as stated below  Constitutional: NEGATIVE for fever, chills, change in weight  Skin: NEGATIVE for worrisome rashes, moles or lesions  GI/: NEGATIVE for bowel or bladder changes  Neuro: NEGATIVE for weakness, dizziness or paresthesias    Imaging  MRI OF THE CERVICAL SPINE WITHOUT CONTRAST  9/3/2020 6:36 PM     COMPARISON: None.     HISTORY: Assess for leftward " disc. Facet arthropathy, cervical. DDD  (degenerative disc disease), cervical. Left cervical radiculopathy.     TECHNIQUE: Multiplanar, multisequence MRI images of the cervical spine  were acquired without intravenous contrast.     FINDINGS: There is normal alignment of the cervical vertebrae;  however, there is reversal of normal cervical lordosis centered at the  C4-C5 level. Vertebral body heights of the cervical spine are normal.  Marrow signal throughout the cervical vertebrae is within normal  limits. There is no evidence for fracture or pathologic bony lesion of  the cervical spine.      There is loss of disc height, disc desiccation and posterior disc  bulging to varying degrees at all levels of the cervical spine.       There is abnormal T2 signal hyperintensity in the left lateral aspect  of the cervical spinal cord from the lower C3 level to the level of  the C3-C4 disc that is nonspecific. This signal abnormality could be  due to demyelinating disease but could also represent previous cord  trauma or infection. Clinical correlation recommended. The cervical  spinal cord is otherwise normal in contour and signal intensity.     C2-C3: There is a small posterior central disc herniation  (protrusion). Mild facet arthropathy bilaterally. There is no spinal  canal or neural foraminal stenosis at this level.     C3-C4: There is a minimal posterior broad-based disc osteophyte  complex. Mild facet arthropathy bilaterally. There is no spinal canal  or neural foraminal stenosis at this level.     C4-C5: There is facet arthropathy bilaterally, uncinate hypertrophy  bilaterally and a posterior broad-based disc-osteophyte complex.  Minimal spinal canal narrowing. Moderate right foraminal stenosis.  Mild left foraminal narrowing.     C5-C6: There is facet arthropathy bilaterally, uncinate hypertrophy  bilaterally and a posterior broad-based disc-osteophyte complex.  Moderate spinal canal stenosis. Moderate bilateral  neural foraminal  stenosis.     C6-C7: There is facet arthropathy bilaterally, uncinate hypertrophy  bilaterally and a posterior broad-based disc-osteophyte complex. Mild  spinal canal narrowing. Moderate left foraminal stenosis. Mild right  foraminal narrowing.     C7-T1: Minimal loss of disc height and disc desiccation. Mild facet  arthropathy bilaterally. There is no spinal canal or neural foraminal  stenosis at this level.                                                                      IMPRESSION:  1. Diffuse degenerative changes of the cervical spine as detailed  above.  2. Focus of abnormal T2 signal hyperintensity in the left lateral  aspect of the cervical spinal cord at the lower C3 and C3-C4 level  that is of uncertain significance. Differential diagnosis includes  demyelinating disease, previous infection or previous trauma. While  spinal cord malignancy is considered much less likely, tumor cannot be  completely excluded. Postcontrast MRI images of the cervical spine  recommended to exclude active pathologic process.  3. Moderate spinal canal stenosis at the C5-C6 level. No other  significant spinal canal narrowing of the cervical spine.  4. Moderate to severe neural foraminal stenosis on the right at C4-C5,  bilaterally at C5-C6 and on the left at C6-C7.     NICK GALLEGOS MD    Assessment/Plan:  Patient Instructions     1. Abnormal MRI, spinal cord    2. Facet arthropathy, cervical    3. DDD (degenerative disc disease), cervical    4. Left cervical radiculopathy    5. Herniated disc, cervical    6. Central spinal stenosis    7. Foraminal stenosis of cervical region      Reviewed MRI which shows narrowing in the central canal and around the nerves This explains the numbness  The MRI also shows a small area of increased signal in the spinal cord that requires additional imaging to further understand  MRI with contrast of your neck has been ordered. Schedule with  [place] ([phone]).    Let's  keep your current follow-up visit on 9/10 and will review results of this additional MRI at that time.    Fabian Hsieh DO, CANORMAM  [facility]

## 2020-09-08 ENCOUNTER — TRANSFERRED RECORDS (OUTPATIENT)
Dept: HEALTH INFORMATION MANAGEMENT | Facility: CLINIC | Age: 55
End: 2020-09-08

## 2020-09-10 ENCOUNTER — OFFICE VISIT (OUTPATIENT)
Dept: ORTHOPEDICS | Facility: CLINIC | Age: 55
End: 2020-09-10
Payer: COMMERCIAL

## 2020-09-10 VITALS
BODY MASS INDEX: 31.65 KG/M2 | HEIGHT: 65 IN | WEIGHT: 190 LBS | SYSTOLIC BLOOD PRESSURE: 138 MMHG | DIASTOLIC BLOOD PRESSURE: 88 MMHG

## 2020-09-10 DIAGNOSIS — M50.30 DDD (DEGENERATIVE DISC DISEASE), CERVICAL: ICD-10-CM

## 2020-09-10 DIAGNOSIS — R90.89 ABNORMAL MRI, SPINAL CORD: Primary | ICD-10-CM

## 2020-09-10 DIAGNOSIS — M48.02 FORAMINAL STENOSIS OF CERVICAL REGION: ICD-10-CM

## 2020-09-10 DIAGNOSIS — M54.12 LEFT CERVICAL RADICULOPATHY: ICD-10-CM

## 2020-09-10 DIAGNOSIS — M48.00 CENTRAL SPINAL STENOSIS: ICD-10-CM

## 2020-09-10 DIAGNOSIS — M50.20 HERNIATED DISC, CERVICAL: ICD-10-CM

## 2020-09-10 DIAGNOSIS — M47.812 FACET ARTHROPATHY, CERVICAL: ICD-10-CM

## 2020-09-10 PROCEDURE — 99214 OFFICE O/P EST MOD 30 MIN: CPT | Performed by: FAMILY MEDICINE

## 2020-09-10 ASSESSMENT — MIFFLIN-ST. JEOR: SCORE: 1463.74

## 2020-09-10 NOTE — Clinical Note
Tammie Graham,  Repeat MRI with contrast mentions possible de-myelinating process. Azul doesn't appear to have any other symptoms that would be concerning for MS. I didn't recommend referral to Neurology but wanted to make you aware of the results and see your thoughts.    Thanks  Fabian

## 2020-09-10 NOTE — PROGRESS NOTES
"ASSESSMENT & PLAN    1. Abnormal MRI, spinal cord    2. Facet arthropathy, cervical    3. DDD (degenerative disc disease), cervical    4. Left cervical radiculopathy    5. Herniated disc, cervical    6. Central spinal stenosis    7. Foraminal stenosis of cervical region      Reviewed MRI - abnormal finding is not relate to a tumor / neoplastic process  Does not appear to have other symptoms concerning for MS but will forward to PCP to insure appropriate coordination and determine their thoughts on Neurology referral  Since pain / numbness due to disc bulging and nerve impingement is well controlled with 2 tabs of Ibuprofen recommend no further intervention  Limit prolonged neck extension and overhead work  If pain increased can consider an injection. Review interlaminar vs transforaminal.    -----    SUBJECTIVE:  Azul Dsozua is a 54 year old female who is seen in follow-up for left forearm numbness and MRI results.They were last seen 9/5/2020 for a virtual visit and had MRI cervical spine with contrast completed at [facility] on 9/8/20.     Since their last visit reports 0% - (About the same as last time). They indicate that their current pain level is 0/10. Takes 2 tbs Ibuprofen daily which controls her pain. Denies any vision changes, fine motor issues, dropping items and concerns about hand dexterity. No tremors.     The patient is seen by themselves.    Patient's past medical, surgical, social, and family histories were reviewed today and no pertinent history related to patient's presenting problem.    REVIEW OF SYSTEMS:  Constitutional: NEGATIVE for fever, chills, change in weight  Skin: NEGATIVE for worrisome rashes, moles or lesions  GI/: NEGATIVE for bowel or bladder changes  Neuro: NEGATIVE for weakness, dizziness or paresthesias    OBJECTIVE:  /88   Ht 1.645 m (5' 4.75\")   Wt 86.2 kg (190 lb)   BMI 31.86 kg/m     General: healthy, alert and in no distress  HEENT: no scleral icterus or " conjunctival erythema  Skin: no suspicious lesions or rash. No jaundice.  CV: regular rhythm by palpation, no pedal edema  Resp: normal respiratory effort without conversational dyspnea   Psych: normal mood and affect  Gait: normal steady gait with appropriate coordination and balance  MSK:  Deferred    Independent visualization of the below image:  Other Result Information   Interface, Powerscribe - 09/08/2020 10:28 PM CDT  EXAM: MR SPINE CERVICAL WWO  LOCATION: [facility]  DATE/TIME: 9/8/2020 9:18 PM    INDICATION: Cervical spine pain, abnormal cord signal at C3-C4.  COMPARISON: MRI 9/3/2020, without contrast.  CONTRAST: Gadobutrol 1 mmol/mL IV 7.5 mL Vial: 7.5 mL.  TECHNIQUE: Without and with IV contrast.    FINDINGS:   There is slight reversal of normal curvature and a trace of retrolisthesis at  C5-C6 incidentally noted. Otherwise anatomic alignment maintained. Vertebral  body heights maintained. Marrow signal patterns are unremarkable, no pathologic  marrow replacement or abnormal enhancement. The previously noted spinal cord  parenchymal signal abnormality is redemonstrated, with increased T2 signal in  the left hemicord at the level of C3, approximately 3 x 4 mm in axial  cross-section and 9 mm cephalocaudal. There is no appreciable associated  enhancement and no definite altered cord dimension. The remainder of the spinal  cord is normal in configuration and signal pattern. No extraspinal abnormality.    Craniovertebral junction and C1-C2: Within normal limits.    C2-C3: Central disc herniation indenting the thecal sac, minimally contacting  the cord. Facets unremarkable. Spinal canal otherwise patent, neural foramina  patent bilaterally.     C3-C4: Shallow central disc protrusion, facets unremarkable. No significant  spinal canal or neural foraminal stenosis.     C4-C5: Disc desiccation and diminished height, disc osteophyte more to the right  side with uncinate spurring on the right. Minimal facet  arthropathy right more  than left. Mild spinal canal narrowing, moderately severe right and mild left  neural foraminal stenosis.     C5-C6: Mild to moderate degenerative spondylosis more to the right side with  desiccation and decreased disc height, mild Modic type II signal changes, disc  osteophyte complex and moderate uncinate spurring left more than right. Mild  facet arthropathy right more than left. Mild to moderate spinal canal stenosis.  Moderately severe neural foraminal stenosis right more than left.     C6-C7: Mild disc osteophyte, left paracentral and foraminal protrusion and/or  uncinate spurring. Facet within normal limits. Mild spinal canal narrowing,  moderately severe left and mild to moderate right neural foraminal stenosis.     C7-T1: Unremarkable.    IMPRESSION:  1.  No significant interval change, left spinal cord hyperintense T2 signal at  the C3 level without abnormal enhancement, without mass effect or myelomalacia,  differential considerations include demyelinating plaque, subacute to chronic,  posttraumatic change, other infectious/inflammatory etiology without  enhancement, nonenhancing neoplasm or lymphoma much less likely.  2.  Multilevel degenerative spondylosis changes most prominent at C5-C6 with  multilevel mild to moderate spinal canal narrowing most prominent at C5-C6.  3.  Left foraminal protrusion at C6-C7 and multilevel neural foraminal stenosis  due to degenerative changes, see body of report for level by level details.     Patient's conditions were thoroughly discussed during today's visit with greater than 50% of the visit spent counseling the patient with total time spent face-to-face with the patient being 25 minutes.    Fabian Hsieh, DO CAQSM  [facility]        9/11/2020 I did forward with a note to Dr. Clancy, her PCP.  Gladys Ramey MD

## 2020-09-10 NOTE — LETTER
9/10/2020         RE: Azul Dsouza  [address] [facility] [address]        Dear Colleague,    Thank you for referring your patient, Azul Dsouza, to the [facility]. Please see a copy of my visit note below.    ASSESSMENT & PLAN    1. Abnormal MRI, spinal cord    2. Facet arthropathy, cervical    3. DDD (degenerative disc disease), cervical    4. Left cervical radiculopathy    5. Herniated disc, cervical    6. Central spinal stenosis    7. Foraminal stenosis of cervical region      Reviewed MRI - abnormal finding is not relate to a tumor / neoplastic process  Does not appear to have other symptoms concerning for MS but will forward to PCP to insure appropriate coordination and determine their thoughts on Neurology referral  Since pain / numbness due to disc bulging and nerve impingement is well controlled with 2 tabs of Ibuprofen recommend no further intervention  Limit prolonged neck extension and overhead work  If pain increased can consider an injection. Review interlaminar vs transforaminal.    -----    SUBJECTIVE:  Azul Dsouza is a 54 year old female who is seen in follow-up for left forearm numbness and MRI results.They were last seen 9/5/2020 for a virtual visit and had MRI cervical spine with contrast completed at [facility] on 9/8/20.     Since their last visit reports 0% - (About the same as last time). They indicate that their current pain level is 0/10. Takes 2 tbs Ibuprofen daily which controls her pain. Denies any vision changes, fine motor issues, dropping items and concerns about hand dexterity. No tremors.     The patient is seen by themselves.    Patient's past medical, surgical, social, and family histories were reviewed today and no pertinent history related to patient's presenting problem.    REVIEW OF SYSTEMS:  Constitutional: NEGATIVE for fever, chills, change in weight  Skin: NEGATIVE for worrisome rashes, moles or lesions  GI/: NEGATIVE  "for bowel or bladder changes  Neuro: NEGATIVE for weakness, dizziness or paresthesias    OBJECTIVE:  /88   Ht 1.645 m (5' 4.75\")   Wt 86.2 kg (190 lb)   BMI 31.86 kg/m     General: healthy, alert and in no distress  HEENT: no scleral icterus or conjunctival erythema  Skin: no suspicious lesions or rash. No jaundice.  CV: regular rhythm by palpation, no pedal edema  Resp: normal respiratory effort without conversational dyspnea   Psych: normal mood and affect  Gait: normal steady gait with appropriate coordination and balance  MSK:  Deferred    Independent visualization of the below image:  Other Result Information   Interface, Powerscribe - 09/08/2020 10:28 PM CDT  EXAM: MR SPINE CERVICAL WWO  LOCATION: [facility]  DATE/TIME: 9/8/2020 9:18 PM    INDICATION: Cervical spine pain, abnormal cord signal at C3-C4.  COMPARISON: MRI 9/3/2020, without contrast.  CONTRAST: Gadobutrol 1 mmol/mL IV 7.5 mL Vial: 7.5 mL.  TECHNIQUE: Without and with IV contrast.    FINDINGS:   There is slight reversal of normal curvature and a trace of retrolisthesis at  C5-C6 incidentally noted. Otherwise anatomic alignment maintained. Vertebral  body heights maintained. Marrow signal patterns are unremarkable, no pathologic  marrow replacement or abnormal enhancement. The previously noted spinal cord  parenchymal signal abnormality is redemonstrated, with increased T2 signal in  the left hemicord at the level of C3, approximately 3 x 4 mm in axial  cross-section and 9 mm cephalocaudal. There is no appreciable associated  enhancement and no definite altered cord dimension. The remainder of the spinal  cord is normal in configuration and signal pattern. No extraspinal abnormality.    Craniovertebral junction and C1-C2: Within normal limits.    C2-C3: Central disc herniation indenting the thecal sac, minimally contacting  the cord. Facets unremarkable. Spinal canal otherwise patent, neural foramina  patent bilaterally.     C3-C4: " Shallow central disc protrusion, facets unremarkable. No significant  spinal canal or neural foraminal stenosis.     C4-C5: Disc desiccation and diminished height, disc osteophyte more to the right  side with uncinate spurring on the right. Minimal facet arthropathy right more  than left. Mild spinal canal narrowing, moderately severe right and mild left  neural foraminal stenosis.     C5-C6: Mild to moderate degenerative spondylosis more to the right side with  desiccation and decreased disc height, mild Modic type II signal changes, disc  osteophyte complex and moderate uncinate spurring left more than right. Mild  facet arthropathy right more than left. Mild to moderate spinal canal stenosis.  Moderately severe neural foraminal stenosis right more than left.     C6-C7: Mild disc osteophyte, left paracentral and foraminal protrusion and/or  uncinate spurring. Facet within normal limits. Mild spinal canal narrowing,  moderately severe left and mild to moderate right neural foraminal stenosis.     C7-T1: Unremarkable.    IMPRESSION:  1.  No significant interval change, left spinal cord hyperintense T2 signal at  the C3 level without abnormal enhancement, without mass effect or myelomalacia,  differential considerations include demyelinating plaque, subacute to chronic,  posttraumatic change, other infectious/inflammatory etiology without  enhancement, nonenhancing neoplasm or lymphoma much less likely.  2.  Multilevel degenerative spondylosis changes most prominent at C5-C6 with  multilevel mild to moderate spinal canal narrowing most prominent at C5-C6.  3.  Left foraminal protrusion at C6-C7 and multilevel neural foraminal stenosis  due to degenerative changes, see body of report for level by level details.     Patient's conditions were thoroughly discussed during today's visit with greater than 50% of the visit spent counseling the patient with total time spent face-to-face with the patient being 25  minutes.    Fabian Hsieh DO [facility]          Again, thank you for allowing me to participate in the care of your patient.        Sincerely,        Fabian Hsieh DO

## 2020-09-11 NOTE — PATIENT INSTRUCTIONS
1. Abnormal MRI, spinal cord    2. Facet arthropathy, cervical    3. DDD (degenerative disc disease), cervical    4. Left cervical radiculopathy    5. Herniated disc, cervical    6. Central spinal stenosis    7. Foraminal stenosis of cervical region      Reviewed MRI - abnormal finding is not relate to a tumor / neoplastic process  Since pain / numbness due to disc bulging and nerve impingement is well controlled with 2 tabs of Ibuprofen recommend no further intervention  Limit prolonged neck extension and overhead work  If pain increased can consider an injection. Review interlaminar vs transforaminal.

## 2021-01-15 ENCOUNTER — HEALTH MAINTENANCE LETTER (OUTPATIENT)
Age: 56
End: 2021-01-15

## 2021-01-23 ENCOUNTER — HEALTH MAINTENANCE LETTER (OUTPATIENT)
Age: 56
End: 2021-01-23

## 2021-07-28 ENCOUNTER — OFFICE VISIT (OUTPATIENT)
Dept: FAMILY MEDICINE | Facility: CLINIC | Age: 56
End: 2021-07-28
Payer: COMMERCIAL

## 2021-07-28 VITALS
SYSTOLIC BLOOD PRESSURE: 124 MMHG | RESPIRATION RATE: 16 BRPM | HEIGHT: 65 IN | WEIGHT: 157.8 LBS | DIASTOLIC BLOOD PRESSURE: 78 MMHG | TEMPERATURE: 98.1 F | HEART RATE: 60 BPM | OXYGEN SATURATION: 98 % | BODY MASS INDEX: 26.29 KG/M2

## 2021-07-28 DIAGNOSIS — M54.2 NECK PAIN: ICD-10-CM

## 2021-07-28 DIAGNOSIS — Z12.31 ENCOUNTER FOR SCREENING MAMMOGRAM FOR BREAST CANCER: ICD-10-CM

## 2021-07-28 DIAGNOSIS — E03.9 ACQUIRED HYPOTHYROIDISM: Primary | ICD-10-CM

## 2021-07-28 DIAGNOSIS — Z23 NEED FOR TDAP VACCINATION: ICD-10-CM

## 2021-07-28 LAB — TSH SERPL DL<=0.005 MIU/L-ACNC: 1.68 MU/L (ref 0.4–4)

## 2021-07-28 PROCEDURE — 90715 TDAP VACCINE 7 YRS/> IM: CPT | Performed by: PHYSICIAN ASSISTANT

## 2021-07-28 PROCEDURE — 36415 COLL VENOUS BLD VENIPUNCTURE: CPT | Performed by: PHYSICIAN ASSISTANT

## 2021-07-28 PROCEDURE — 99214 OFFICE O/P EST MOD 30 MIN: CPT | Mod: 25 | Performed by: PHYSICIAN ASSISTANT

## 2021-07-28 PROCEDURE — 90471 IMMUNIZATION ADMIN: CPT | Performed by: PHYSICIAN ASSISTANT

## 2021-07-28 PROCEDURE — 84443 ASSAY THYROID STIM HORMONE: CPT | Performed by: PHYSICIAN ASSISTANT

## 2021-07-28 RX ORDER — LEVOTHYROXINE SODIUM 112 UG/1
112 TABLET ORAL DAILY
Qty: 90 TABLET | Refills: 3 | Status: CANCELLED | OUTPATIENT
Start: 2021-07-28

## 2021-07-28 ASSESSMENT — MIFFLIN-ST. JEOR: SCORE: 1316.66

## 2021-07-28 NOTE — PROGRESS NOTES
"    Assessment & Plan     Acquired hypothyroidism  Will check lab today.  Refills will be sent in pending results.  - TSH with free T4 reflex; Future  - TSH with free T4 reflex    Neck pain  Discussed that she can go back to Ortho or physical therapy.  She is not interested in either at this point.  Try topical NSAID rather than oral to see if this helps.    - diclofenac (VOLTAREN) 1 % topical gel; Apply 4 g topically 4 times daily    Need for Tdap vaccination    -      ADMIN VACCINE, FIRST    Encounter for screening mammogram for breast cancer    - MA SCREENING DIGITAL BILAT - Future  (s+30); Future    Review of external notes as documented elsewhere in note         BMI:   Estimated body mass index is 26.26 kg/m  as calculated from the following:    Height as of this encounter: 1.651 m (5' 5\").    Weight as of this encounter: 71.6 kg (157 lb 12.8 oz).   Weight management plan: Discussed healthy diet and exercise guidelines      Return in 1 year (on 7/28/2022) for Physical Exam- Wellness.    Nando Honeycutt PA-C  [facility] SYLVIA Liang is a 55 year old who presents for the following health issues     HPI     New Patient/Transfer of Care  Hypothyroidism Follow-up      Since last visit, patient describes the following symptoms: Weight stable, no hair loss, no skin changes, no constipation, no loose stools      How many servings of fruits and vegetables do you eat daily?  4 or more    On average, how many sweetened beverages do you drink each day (Examples: soda, juice, sweet tea, etc.  Do NOT count diet or artificially sweetened beverages)?   1    How many days per week do you exercise enough to make your heart beat faster? 7    How many minutes a day do you exercise enough to make your heart beat faster? 30 - 60    How many days per week do you miss taking your medication? 0    Patient is looking for a PCP to do \"all of her care in one place\".  She can originally from [facility] " "about a year ago.  PMH significant only for thyroid problem.  She did have some breathing issues at one point for which she was given pulmicort.  She uses this now only if she gets a cold.    She mentions that she was seen by Ortho last year for neck pain.  Her MRI has rather significant pathology on it.  The numbness in her arm is better but her neck often hurts and will cause a headache.  Taking ibuprofen several times daily for a few days tends to resolve this.  Wondering if there is anything else she can do.    Last PAP in 2019 but cells were not sufficient.  Still told 5 year follow up.  HPV negative.  Wondering about this today.  Due for mammogram this year.  Did cologuard in 2019.    Able to review these notes from [facility] transferred in chart.    Review of Systems   Constitutional, HEENT, cardiovascular, pulmonary, gi and gu systems are negative, except as otherwise noted.      Objective    /78 (BP Location: Right arm, Patient Position: Sitting, Cuff Size: Adult Regular)   Pulse 60   Temp 98.1  F (36.7  C) (Oral)   Resp 16   Ht 1.651 m (5' 5\")   Wt 71.6 kg (157 lb 12.8 oz)   SpO2 98%   BMI 26.26 kg/m    Body mass index is 26.26 kg/m .  Physical Exam   GENERAL: healthy, alert and no distress  MS: no gross musculoskeletal defects noted, no edema  SKIN: no suspicious lesions or rashes  PSYCH: mentation appears normal, affect normal/bright          "

## 2021-07-28 NOTE — PROGRESS NOTES
Prior to immunization administration, verified patients identity using patient s name and date of birth. Please see Immunization Activity for additional information.     Screening Questionnaire for Adult Immunization    Are you sick today?   No   Do you have allergies to medications, food, a vaccine component or latex?   Yes   Have you ever had a serious reaction after receiving a vaccination?   No   Do you have a long-term health problem with heart, lung, kidney, or metabolic disease (e.g., diabetes), asthma, a blood disorder, no spleen, complement component deficiency, a cochlear implant, or a spinal fluid leak?  Are you on long-term aspirin therapy?   No   Do you have cancer, leukemia, HIV/AIDS, or any other immune system problem?   No   Do you have a parent, brother, or sister with an immune system problem?   No   In the past 3 months, have you taken medications that affect  your immune system, such as prednisone, other steroids, or anticancer drugs; drugs for the treatment of rheumatoid arthritis, Crohn s disease, or psoriasis; or have you had radiation treatments?   No   Have you had a seizure, or a brain or other nervous system problem?   No   During the past year, have you received a transfusion of blood or blood    products, or been given immune (gamma) globulin or antiviral drug?   No   For women: Are you pregnant or is there a chance you could become       pregnant during the next month?   No   Have you received any vaccinations in the past 4 weeks?   No     Immunization questionnaire was positive for at least one answer.  Notified Nando Honeycutt PA-C.        Per orders of Nando Honeycutt PA-C, injection of Tdap given by Lisa A. Magill, CMA. Patient instructed to remain in clinic for 15 minutes afterwards, and to report any adverse reaction to me immediately.       Screening performed by Lisa A. Magill, CMA on 7/28/2021 at 9:16 AM.

## 2021-07-28 NOTE — NURSING NOTE
"Chief Complaint   Patient presents with     New Patient     Establish Care     Imm/Inj     Tdap     Thyroid Problem     Initial /78 (BP Location: Right arm, Patient Position: Sitting, Cuff Size: Adult Regular)   Pulse 60   Temp 98.1  F (36.7  C) (Oral)   Resp 16   Ht 1.651 m (5' 5\")   Wt 71.6 kg (157 lb 12.8 oz)   SpO2 98%   BMI 26.26 kg/m   Estimated body mass index is 26.26 kg/m  as calculated from the following:    Height as of this encounter: 1.651 m (5' 5\").    Weight as of this encounter: 71.6 kg (157 lb 12.8 oz).  BP completed using cuff size regular right arm    Lisa Magill, CMA    "

## 2021-08-03 DIAGNOSIS — E03.9 ACQUIRED HYPOTHYROIDISM: ICD-10-CM

## 2021-08-03 RX ORDER — LEVOTHYROXINE SODIUM 112 UG/1
112 TABLET ORAL DAILY
Qty: 90 TABLET | Refills: 3 | Status: SHIPPED | OUTPATIENT
Start: 2021-08-03 | End: 2022-06-27

## 2021-08-04 RX ORDER — LEVOTHYROXINE SODIUM 112 UG/1
TABLET ORAL
Qty: 90 TABLET | Refills: 0 | OUTPATIENT
Start: 2021-08-04

## 2021-09-04 ENCOUNTER — HEALTH MAINTENANCE LETTER (OUTPATIENT)
Age: 56
End: 2021-09-04

## 2021-09-10 ENCOUNTER — ANCILLARY PROCEDURE (OUTPATIENT)
Dept: MAMMOGRAPHY | Facility: CLINIC | Age: 56
End: 2021-09-10
Attending: PHYSICIAN ASSISTANT
Payer: COMMERCIAL

## 2021-09-10 DIAGNOSIS — Z12.31 ENCOUNTER FOR SCREENING MAMMOGRAM FOR BREAST CANCER: ICD-10-CM

## 2021-09-10 PROCEDURE — 77067 SCR MAMMO BI INCL CAD: CPT | Mod: TC | Performed by: RADIOLOGY

## 2021-11-25 ENCOUNTER — E-VISIT (OUTPATIENT)
Dept: URGENT CARE | Facility: CLINIC | Age: 56
End: 2021-11-25
Payer: COMMERCIAL

## 2021-11-25 DIAGNOSIS — Z20.822 SUSPECTED COVID-19 VIRUS INFECTION: ICD-10-CM

## 2021-11-25 DIAGNOSIS — J02.9 SORE THROAT: ICD-10-CM

## 2021-11-25 PROCEDURE — 99421 OL DIG E/M SVC 5-10 MIN: CPT | Performed by: PHYSICIAN ASSISTANT

## 2021-11-25 NOTE — PATIENT INSTRUCTIONS
Dear Azul Dsouza,    Your symptoms show that you may have coronavirus (COVID-19). This illness can cause fever, cough and trouble breathing. Many people get a mild case and get better on their own. Some people can get very sick.    Because you also reported sore throat I would like to also test you for Strep Throat to determine if we need to treat you for that as well.    What should I do?  We would like to test you for Covid-19 virus and Strep Throat. I have placed orders for these tests.   To schedule: go to your [employer] home page and scroll down to the section that says  You have an appointment that needs to be scheduled  and click the large green button that says  Schedule Now  and follow the steps to find the next available openings. It is important that when you are asked what the reason for your appointment is that you mention you need BOTH Covid and Strep tests.    If you are unable to complete these [employer] scheduling steps, please call [phone] to schedule your testing.     Return to work/school/ guidance:   Please let your workplace manager and staffing office know when your quarantine ends     We can t give you an exact date as it depends on the above. You can calculate this on your own or work with your manager/staffing office to calculate this. (For example if you were exposed on 10/4, you would have to quarantine for 14 full days. That would be through 10/18. You could return on 10/19.)      If you receive a positive COVID-19 test result, follow the guidance of the those who are giving you the results. Usually the return to work is 10 (or in some cases 20 days from symptom onset.) If you work at [employer], you must also be cleared by Employee Occupational Health and Safety to return to work.        If you receive a negative COVID-19 test result and did not have a high risk exposure to someone with a known positive COVID-19 test, you can return to work once you're free of  fever for 24 hours without fever-reducing medication and your symptoms are improving or resolved.      If you receive a negative COVID-19 test and If you had a high risk exposure to someone who has tested positive for COVID-19 then you can return to work 14 days after your last contact with the positive individual    Note: If you have ongoing exposure to the covid positive person, this quarantine period may be more than 14 days. (For example, if you are continued to be exposed to your child who tested positive and cannot isolate from them, then the quarantine of 7-14 days can't start until your child is no longer contagious. This is typically 10 days from onset of the child's symptoms. So the total duration may be 17-24 days in this case.)    Sign up for [employer].   We know it's scary to hear that you might have COVID-19. We want to track your symptoms to make sure you're okay over the next 2 weeks. Please look for an email from [employer]--this is a free, online program that we'll use to keep in touch. To sign up, follow the link in the email you will receive. Learn more at [email or web address]    How can I take care of myself?    Get lots of rest. Drink extra fluids (unless a doctor has told you not to)    Take Tylenol (acetaminophen) or ibuprofen for fever or pain. If you have liver or kidney problems, ask your family doctor if it's okay to take Tylenol o ibuprofen    If you have other health problems (like cancer, heart failure, an organ transplant or severe kidney disease): Call your specialty clinic if you don't feel better in the next 2 days.    Know when to call 911. Emergency warning signs include:  o Trouble breathing or shortness of breath  o Pain or pressure in the chest that doesn't go away  o Feeling confused like you haven't felt before, or not being able to wake up  o Bluish-colored lips or face    Where can I get more information?  [facility] - About COVID-19:    [email or web address]    CDC - What to Do If You're Sick:   www.cdc.gov/coronavirus/2019-ncov/about/steps-when-sick.html    November 25, 2021  RE:  Azul Dsouza                                                                                                                  [id number] [facility] [id number]      To whom it may concern:    I evaluated Azul Dsouza on November 25, 2021. Azul Dsouza should be excused from work/school.     They should let their workplace manager and staffing office know when their quarantine ends.    We can not give an exact date as it depends on the information below. They can calculate this on their own or work with their manager/staffing office to calculate this. (For example if they were exposed on 10/04, they would have to quarantine for 14 full days. That would be through 10/18. They could return on 10/19.)    Quarantine Guidelines:      If patient receives a positive COVID-19 test result, they should follow the guidance of those who are giving the results. Usually the return to work is 10 (or in some cases 20 days from symptom onset.) If they work at [employer] [employer], they must be cleared by Employee Occupational Health and Safety to return to work.        If patient receives a negative COVID-19 test result and did not have a high risk exposure to someone with a known positive COVID-19 test, they can return to work once they're free of fever for 24 hours without fever-reducing medication and their symptoms are improving or resolved.      If patient receives a negative COVID-19 test and if they had a high risk exposure to someone who has tested positive for COVID-19 then they can return to work 14 days after their last contact with the positive individual    Note: If there is ongoing exposure to the covid positive person, this quarantine period may be longer than 14 days. (For example, if they are continually exposed to their child, who tested  positive and cannot isolate from them, then the quarantine of 7-14 days can't start until their child is no longer contagious. This is typically 10 days from onset to the child's symptoms. So the total duration may be 17-24 days in this case.)     Sincerely,  Jewell Noguera PA-C

## 2021-11-26 ENCOUNTER — LAB (OUTPATIENT)
Dept: URGENT CARE | Facility: URGENT CARE | Age: 56
End: 2021-11-26
Attending: PHYSICIAN ASSISTANT
Payer: COMMERCIAL

## 2021-11-26 DIAGNOSIS — Z20.822 SUSPECTED COVID-19 VIRUS INFECTION: ICD-10-CM

## 2021-11-26 DIAGNOSIS — J02.9 SORE THROAT: ICD-10-CM

## 2021-11-26 LAB
DEPRECATED S PYO AG THROAT QL EIA: NEGATIVE
GROUP A STREP BY PCR: NOT DETECTED

## 2021-11-26 PROCEDURE — U0003 INFECTIOUS AGENT DETECTION BY NUCLEIC ACID (DNA OR RNA); SEVERE ACUTE RESPIRATORY SYNDROME CORONAVIRUS 2 (SARS-COV-2) (CORONAVIRUS DISEASE [COVID-19]), AMPLIFIED PROBE TECHNIQUE, MAKING USE OF HIGH THROUGHPUT TECHNOLOGIES AS DESCRIBED BY CMS-2020-01-R: HCPCS

## 2021-11-26 PROCEDURE — 87651 STREP A DNA AMP PROBE: CPT

## 2021-11-26 PROCEDURE — U0005 INFEC AGEN DETEC AMPLI PROBE: HCPCS

## 2021-11-27 LAB — SARS-COV-2 RNA RESP QL NAA+PROBE: POSITIVE

## 2021-12-08 ENCOUNTER — E-VISIT (OUTPATIENT)
Dept: FAMILY MEDICINE | Facility: CLINIC | Age: 56
End: 2021-12-08
Payer: COMMERCIAL

## 2021-12-08 DIAGNOSIS — R30.0 DIFFICULT OR PAINFUL URINATION: Primary | ICD-10-CM

## 2021-12-08 PROCEDURE — 99207 PR NON-BILLABLE SERV PER CHARTING: CPT | Performed by: PHYSICIAN ASSISTANT

## 2021-12-08 NOTE — PATIENT INSTRUCTIONS
Dear Azul Dsouza,     After reviewing your responses, I would like you to come in for a urine test to make sure we treat you correctly. This urine test is to evaluate you for a possible urinary tract infection, and should be scheduled for today or tomorrow. Schedule a Lab Only appointment here.     Lab appointments are not available at most locations on the weekends. If no Lab Only appointment is available, you should be seen in any of our convenient Walk-in or Urgent Care Centers, which can be found on our website here.     You will receive instructions with your results in [employer] once they are available.     If your symptoms worsen, you develop pain in your back or stomach, develop fevers, or are not improving in 5 days, please contact your primary care provider for an appointment or visit a Walk-in or Urgent Care Center to be seen.     Thanks again for choosing us as your health care partner,     Nando Honeycutt PA-C

## 2022-06-22 ASSESSMENT — ENCOUNTER SYMPTOMS
CHILLS: 0
ABDOMINAL PAIN: 0
SHORTNESS OF BREATH: 0
MYALGIAS: 0
DYSURIA: 0
ARTHRALGIAS: 0
FEVER: 0
FREQUENCY: 0
PARESTHESIAS: 0
PALPITATIONS: 0
SORE THROAT: 0
HEADACHES: 1
HEMATOCHEZIA: 0
DIARRHEA: 0
WEAKNESS: 0
EYE PAIN: 0
JOINT SWELLING: 0
HEARTBURN: 0
COUGH: 0
BREAST MASS: 0
CONSTIPATION: 0
DIZZINESS: 0
HEMATURIA: 0
NAUSEA: 0
NERVOUS/ANXIOUS: 0

## 2022-06-27 ENCOUNTER — OFFICE VISIT (OUTPATIENT)
Dept: FAMILY MEDICINE | Facility: CLINIC | Age: 57
End: 2022-06-27
Payer: COMMERCIAL

## 2022-06-27 VITALS
WEIGHT: 158.1 LBS | DIASTOLIC BLOOD PRESSURE: 82 MMHG | HEART RATE: 61 BPM | TEMPERATURE: 98.1 F | OXYGEN SATURATION: 97 % | SYSTOLIC BLOOD PRESSURE: 128 MMHG | RESPIRATION RATE: 16 BRPM | BODY MASS INDEX: 26.31 KG/M2

## 2022-06-27 DIAGNOSIS — Z00.00 ROUTINE GENERAL MEDICAL EXAMINATION AT A HEALTH CARE FACILITY: Primary | ICD-10-CM

## 2022-06-27 DIAGNOSIS — M54.2 NECK PAIN: ICD-10-CM

## 2022-06-27 DIAGNOSIS — Z23 HIGH PRIORITY FOR 2019-NCOV VACCINE: ICD-10-CM

## 2022-06-27 DIAGNOSIS — E03.9 ACQUIRED HYPOTHYROIDISM: ICD-10-CM

## 2022-06-27 DIAGNOSIS — J45.909 MILD REACTIVE AIRWAYS DISEASE, UNSPECIFIED WHETHER PERSISTENT: ICD-10-CM

## 2022-06-27 LAB
ERYTHROCYTE [DISTWIDTH] IN BLOOD BY AUTOMATED COUNT: 11.8 % (ref 10–15)
HCT VFR BLD AUTO: 37.4 % (ref 35–47)
HGB BLD-MCNC: 11.9 G/DL (ref 11.7–15.7)
MCH RBC QN AUTO: 31 PG (ref 26.5–33)
MCHC RBC AUTO-ENTMCNC: 31.8 G/DL (ref 31.5–36.5)
MCV RBC AUTO: 97 FL (ref 78–100)
PLATELET # BLD AUTO: 210 10E3/UL (ref 150–450)
RBC # BLD AUTO: 3.84 10E6/UL (ref 3.8–5.2)
WBC # BLD AUTO: 6.9 10E3/UL (ref 4–11)

## 2022-06-27 PROCEDURE — 84443 ASSAY THYROID STIM HORMONE: CPT | Performed by: PHYSICIAN ASSISTANT

## 2022-06-27 PROCEDURE — 99214 OFFICE O/P EST MOD 30 MIN: CPT | Mod: 25 | Performed by: PHYSICIAN ASSISTANT

## 2022-06-27 PROCEDURE — 91306 COVID-19,PF,MODERNA (18+ YRS BOOSTER .25ML): CPT | Performed by: PHYSICIAN ASSISTANT

## 2022-06-27 PROCEDURE — 36415 COLL VENOUS BLD VENIPUNCTURE: CPT | Performed by: PHYSICIAN ASSISTANT

## 2022-06-27 PROCEDURE — 99396 PREV VISIT EST AGE 40-64: CPT | Mod: 25 | Performed by: PHYSICIAN ASSISTANT

## 2022-06-27 PROCEDURE — 85027 COMPLETE CBC AUTOMATED: CPT | Performed by: PHYSICIAN ASSISTANT

## 2022-06-27 PROCEDURE — 0064A COVID-19,PF,MODERNA (18+ YRS BOOSTER .25ML): CPT | Performed by: PHYSICIAN ASSISTANT

## 2022-06-27 RX ORDER — LEVOTHYROXINE SODIUM 112 UG/1
112 TABLET ORAL DAILY
Qty: 90 TABLET | Refills: 3 | Status: SHIPPED | OUTPATIENT
Start: 2022-06-27 | End: 2023-08-10

## 2022-06-27 ASSESSMENT — ENCOUNTER SYMPTOMS
HEADACHES: 1
ABDOMINAL PAIN: 0
PARESTHESIAS: 0
SHORTNESS OF BREATH: 0
NERVOUS/ANXIOUS: 0
SORE THROAT: 0
HEMATOCHEZIA: 0
FREQUENCY: 0
DIZZINESS: 0
HEARTBURN: 0
EYE PAIN: 0
MYALGIAS: 0
BREAST MASS: 0
PALPITATIONS: 0
CONSTIPATION: 0
FEVER: 0
JOINT SWELLING: 0
NAUSEA: 0
CHILLS: 0
ARTHRALGIAS: 0
HEMATURIA: 0
DIARRHEA: 0
DYSURIA: 0
WEAKNESS: 0
COUGH: 0

## 2022-06-27 ASSESSMENT — PAIN SCALES - GENERAL: PAINLEVEL: NO PAIN (0)

## 2022-06-27 NOTE — PROGRESS NOTES
SUBJECTIVE:   CC: Azul Dsouza is an 56 year old woman who presents for preventive health visit.       Patient has been advised of split billing requirements and indicates understanding: Yes  Healthy Habits:     Getting at least 3 servings of Calcium per day:  Yes    Bi-annual eye exam:  Yes    Dental care twice a year:  Yes    Sleep apnea or symptoms of sleep apnea:  None    Diet:  Regular (no restrictions)    Frequency of exercise:  4-5 days/week    Duration of exercise:  30-45 minutes    Taking medications regularly:  Yes    Medication side effects:  None    PHQ-2 Total Score: 0    Additional concerns today:  No    Patient here for physical today.    Thyroid- will need lab and refill    Neck pain- ongoing, has not tried much other than ibuprofen which does work.            Today's PHQ-2 Score:   PHQ-2 ( 1999 Pfizer) 6/22/2022   Q1: Little interest or pleasure in doing things 0   Q2: Feeling down, depressed or hopeless 0   PHQ-2 Score 0   PHQ-2 Total Score (12-17 Years)- Positive if 3 or more points; Administer PHQ-A if positive -   Q1: Little interest or pleasure in doing things Not at all   Q2: Feeling down, depressed or hopeless Not at all   PHQ-2 Score 0       Abuse: Current or Past (Physical, Sexual or Emotional) - No  Do you feel safe in your environment? Yes        Social History     Tobacco Use     Smoking status: Never Smoker     Smokeless tobacco: Never Used   Substance Use Topics     Alcohol use: Yes     If you drink alcohol do you typically have >3 drinks per day or >7 drinks per week? Yes      Alcohol Use 6/27/2022   Prescreen: >3 drinks/day or >7 drinks/week? -   Prescreen: >3 drinks/day or >7 drinks/week? Yes       Reviewed orders with patient.  Reviewed health maintenance and updated orders accordingly - Yes  Labs reviewed in EPIC    Breast Cancer Screening:    Breast CA Risk Assessment (FHS-7) 6/22/2022   Do you have a family history of breast, colon, or ovarian cancer? No / Unknown          Mammogram Screening: Recommended mammography every 1-2 years with patient discussion and risk factor consideration  Pertinent mammograms are reviewed under the imaging tab.    History of abnormal Pap smear: NO - age 30-65 PAP every 5 years with negative HPV co-testing recommended     Reviewed and updated as needed this visit by clinical staff   Tobacco  Allergies  Meds   Med Hx  Surg Hx  Fam Hx  Soc Hx          Reviewed and updated as needed this visit by Provider                       Review of Systems   Constitutional: Negative for chills and fever.   HENT: Negative for congestion, ear pain, hearing loss and sore throat.    Eyes: Negative for pain and visual disturbance.   Respiratory: Negative for cough and shortness of breath.    Cardiovascular: Negative for chest pain, palpitations and peripheral edema.   Gastrointestinal: Negative for abdominal pain, constipation, diarrhea, heartburn, hematochezia and nausea.   Breasts:  Negative for tenderness, breast mass and discharge.   Genitourinary: Negative for dysuria, frequency, genital sores, hematuria, pelvic pain, urgency, vaginal bleeding and vaginal discharge.   Musculoskeletal: Negative for arthralgias, joint swelling and myalgias.   Skin: Negative for rash.   Neurological: Positive for headaches. Negative for dizziness, weakness and paresthesias.   Psychiatric/Behavioral: Negative for mood changes. The patient is not nervous/anxious.           OBJECTIVE:   /82 (BP Location: Right arm, Patient Position: Sitting, Cuff Size: Adult Regular)   Pulse 61   Temp 98.1  F (36.7  C) (Oral)   Resp 16   Wt 71.7 kg (158 lb 1.6 oz)   LMP  (LMP Unknown)   SpO2 97%   BMI 26.31 kg/m    Physical Exam  GENERAL: healthy, alert and no distress  EYES: Eyes grossly normal to inspection, PERRL and conjunctivae and sclerae normal  HENT: ear canals and TM's normal, nose and mouth without ulcers or lesions  NECK: no adenopathy, no asymmetry, masses, or scars and  "thyroid normal to palpation  RESP: lungs clear to auscultation - no rales, rhonchi or wheezes  BREAST: normal without masses, tenderness or nipple discharge and no palpable axillary masses or adenopathy  CV: regular rate and rhythm, normal S1 S2, no S3 or S4, no murmur, click or rub, no peripheral edema and peripheral pulses strong  ABDOMEN: soft, nontender, no hepatosplenomegaly, no masses and bowel sounds normal  MS: no gross musculoskeletal defects noted, no edema  SKIN: no suspicious lesions or rashes  NEURO: Normal strength and tone, mentation intact and speech normal  PSYCH: mentation appears normal, affect normal/bright    Diagnostic Test Results:  Labs reviewed in Epic    ASSESSMENT/PLAN:   (Z00.00) Routine general medical examination at a health care facility  (primary encounter diagnosis)  Comment:   Plan: CBC with platelets            (E03.9) Acquired hypothyroidism  Comment:   Plan: TSH with free T4 reflex, levothyroxine         (SYNTHROID/LEVOTHROID) 112 MCG tablet        Check lab and refills are given today.    (M54.2) Neck pain  Comment:   Plan: diclofenac (VOLTAREN) 1 % topical gel        Discussed options such as ice/heat and physical therapy.  She elected to try voltaren topical and will follow up if not improving.    (J45.909) Mild reactive airways disease, unspecified whether persistent  Comment:   Plan: stable- did not need refills today.    (Z23) High priority for 2019-nCoV vaccine  Comment:   Plan: COVID-19,PF,MODERNA (18+ Yrs BOOSTER .25mL)              Patient has been advised of split billing requirements and indicates understanding: Yes    COUNSELING:  Reviewed preventive health counseling, as reflected in patient instructions    Estimated body mass index is 26.31 kg/m  as calculated from the following:    Height as of 7/28/21: 1.651 m (5' 5\").    Weight as of this encounter: 71.7 kg (158 lb 1.6 oz).    Weight management plan: Discussed healthy diet and exercise guidelines    She reports " that she has never smoked. She has never used smokeless tobacco.      Counseling Resources:  ATP IV Guidelines  Pooled Cohorts Equation Calculator  Breast Cancer Risk Calculator  BRCA-Related Cancer Risk Assessment: FHS-7 Tool  FRAX Risk Assessment  ICSI Preventive Guidelines  Dietary Guidelines for Americans, 2010  USDA's MyPlate  ASA Prophylaxis  Lung CA Screening    LOUISE Guerrero [facility]

## 2022-06-29 LAB — TSH SERPL DL<=0.005 MIU/L-ACNC: 1.17 MU/L (ref 0.4–4)

## 2022-07-26 ENCOUNTER — NURSE TRIAGE (OUTPATIENT)
Dept: NURSING | Facility: CLINIC | Age: 57
End: 2022-07-26

## 2022-07-26 NOTE — TELEPHONE ENCOUNTER
Triage call  Patient calling to report Headache, fever, cough 103.7 started on Sunday.  She is unable to touch her chin to her chest.      Per protocol  Go to ed now.  Care advice given.  Verbalizes understanding and agrees with plan.  Will call someone to take to the hospital    Enriqueta Knowles RN   [facility] Nurse Advisor  4:37 PM 7/26/2022    COVID 19 Nurse Triage Plan/Patient Instructions    Please be aware that novel coronavirus (COVID-19) may be circulating in the community. If you develop symptoms such as fever, cough, or SOB or if you have concerns about the presence of another infection including coronavirus (COVID-19), please contact your health care provider or visit [email or web address].     Disposition/Instructions    ED Visit recommended. Follow protocol based instructions.     Bring Your Own Device:  Please also bring your smart device(s) (smart phones, tablets, laptops) and their charging cables for your personal use and to communicate with your care team during your visit.    Thank you for taking steps to prevent the spread of this virus.  o Limit your contact with others.  o Wear a simple mask to cover your cough.  o Wash your hands well and often.    Resources    [facility]: About COVID-19: [email or web address]    CDC: What to Do If You're Sick: www.cdc.gov/coronavirus/2019-ncov/about/steps-when-sick.html    CDC: Ending Home Isolation: www.cdc.gov/coronavirus/2019-ncov/hcp/disposition-in-home-patients.html     CDC: Caring for Someone: www.cdc.gov/coronavirus/2019-ncov/if-you-are-sick/care-for-someone.html     [facility]: Interim Guidance for Hospital Discharge to Home: www.health.state.[place]/diseases/coronavirus/hcp/hospdischarge.pdf    [facility] clinical trials (COVID-19 research studies): [email or web address]     Below are the COVID-19 hotlines at the [place] Department of Health ([facility]). Interpreters are available.   o For  health questions: Call 669-056-1661 or 1-647.985.5837 (7 a.m. to 7 p.m.)  o For questions about schools and childcare: Call 403-470-8163 or 1-388.472.1020 (7 a.m. to 7 p.m.)    Reason for Disposition    Headache and stiff neck (can't touch chin to chest)    Additional Information    Negative: Difficult to awaken or acting confused (e.g., disoriented, slurred speech)    Negative: Pale cold skin and very weak (can't stand)    Negative: Difficulty breathing and bluish (or gray) lips or face    Negative: New onset rash with purple (or blood-colored) spots or dots    Negative: Sounds like a life-threatening emergency to the triager    Negative: Fever onset within 24 hours of receiving vaccine    Negative: Fever within 14 days of COVID-19 Exposure    Negative: Pregnant    Negative: Postpartum (from 0 to 6 weeks after delivery)    Protocols used: FEVER-A-[place]

## 2022-07-27 ENCOUNTER — APPOINTMENT (OUTPATIENT)
Dept: GENERAL RADIOLOGY | Facility: CLINIC | Age: 57
DRG: 194 | End: 2022-07-27
Attending: EMERGENCY MEDICINE
Payer: COMMERCIAL

## 2022-07-27 ENCOUNTER — APPOINTMENT (OUTPATIENT)
Dept: ULTRASOUND IMAGING | Facility: CLINIC | Age: 57
DRG: 194 | End: 2022-07-27
Attending: EMERGENCY MEDICINE
Payer: COMMERCIAL

## 2022-07-27 ENCOUNTER — HOSPITAL ENCOUNTER (INPATIENT)
Facility: CLINIC | Age: 57
LOS: 2 days | Discharge: HOME | DRG: 194 | End: 2022-07-29
Attending: EMERGENCY MEDICINE | Admitting: STUDENT IN AN ORGANIZED HEALTH CARE EDUCATION/TRAINING PROGRAM
Payer: COMMERCIAL

## 2022-07-27 DIAGNOSIS — J18.9 PNEUMONIA OF LEFT LOWER LOBE DUE TO INFECTIOUS ORGANISM: ICD-10-CM

## 2022-07-27 LAB
ALBUMIN SERPL BCG-MCNC: 3.2 G/DL (ref 3.5–5.2)
ALBUMIN UR-MCNC: 50 MG/DL
ALP SERPL-CCNC: 202 U/L (ref 35–104)
ALT SERPL W P-5'-P-CCNC: 75 U/L (ref 10–35)
ANION GAP SERPL CALCULATED.3IONS-SCNC: 8 MMOL/L (ref 7–15)
APPEARANCE UR: ABNORMAL
AST SERPL W P-5'-P-CCNC: 110 U/L (ref 10–35)
ATRIAL RATE - MUSE: 101 BPM
B BURGDOR IGG+IGM SER QL: 0.99
BACTERIA #/AREA URNS HPF: ABNORMAL /HPF
BASOPHILS # BLD AUTO: 0 10E3/UL (ref 0–0.2)
BASOPHILS NFR BLD AUTO: 0 %
BILIRUB DIRECT SERPL-MCNC: <0.2 MG/DL (ref 0–0.3)
BILIRUB SERPL-MCNC: 0.7 MG/DL
BILIRUB UR QL STRIP: NEGATIVE
BUN SERPL-MCNC: 10.1 MG/DL (ref 6–20)
CALCIUM SERPL-MCNC: 8.2 MG/DL (ref 8.6–10)
CHLORIDE SERPL-SCNC: 98 MMOL/L (ref 98–107)
COLOR UR AUTO: YELLOW
CREAT SERPL-MCNC: 0.76 MG/DL (ref 0.51–0.95)
CRP SERPL-MCNC: 125.84 MG/L
DEPRECATED HCO3 PLAS-SCNC: 26 MMOL/L (ref 22–29)
DIASTOLIC BLOOD PRESSURE - MUSE: NORMAL MMHG
EOSINOPHIL # BLD AUTO: 0 10E3/UL (ref 0–0.7)
EOSINOPHIL NFR BLD AUTO: 0 %
ERYTHROCYTE [DISTWIDTH] IN BLOOD BY AUTOMATED COUNT: 13.5 % (ref 10–15)
FLUAV RNA SPEC QL NAA+PROBE: NEGATIVE
FLUBV RNA RESP QL NAA+PROBE: NEGATIVE
GFR SERPL CREATININE-BSD FRML MDRD: >90 ML/MIN/1.73M2
GLUCOSE SERPL-MCNC: 105 MG/DL (ref 70–99)
GLUCOSE UR STRIP-MCNC: NEGATIVE MG/DL
HCT VFR BLD AUTO: 36.8 % (ref 35–47)
HGB BLD-MCNC: 11.7 G/DL (ref 11.7–15.7)
HGB UR QL STRIP: ABNORMAL
HOLD SPECIMEN: NORMAL
IMM GRANULOCYTES # BLD: 0 10E3/UL
IMM GRANULOCYTES NFR BLD: 1 %
INTERPRETATION ECG - MUSE: NORMAL
KETONES UR STRIP-MCNC: 10 MG/DL
LACTATE SERPL-SCNC: 0.9 MMOL/L (ref 0.7–2)
LACTATE SERPL-SCNC: 1.2 MMOL/L (ref 0.7–2)
LEUKOCYTE ESTERASE UR QL STRIP: ABNORMAL
LYMPHOCYTES # BLD AUTO: 1.4 10E3/UL (ref 0.8–5.3)
LYMPHOCYTES NFR BLD AUTO: 29 %
MCH RBC QN AUTO: 29.4 PG (ref 26.5–33)
MCHC RBC AUTO-ENTMCNC: 31.8 G/DL (ref 31.5–36.5)
MCV RBC AUTO: 93 FL (ref 78–100)
MONOCYTES # BLD AUTO: 0.4 10E3/UL (ref 0–1.3)
MONOCYTES NFR BLD AUTO: 8 %
MUCOUS THREADS #/AREA URNS LPF: PRESENT /LPF
NEUTROPHILS # BLD AUTO: 3 10E3/UL (ref 1.6–8.3)
NEUTROPHILS NFR BLD AUTO: 62 %
NITRATE UR QL: NEGATIVE
NRBC # BLD AUTO: 0 10E3/UL
NRBC BLD AUTO-RTO: 0 /100
P AXIS - MUSE: 45 DEGREES
PH UR STRIP: 6.5 [PH] (ref 5–7)
PLAT MORPH BLD: NORMAL
PLATELET # BLD AUTO: 201 10E3/UL (ref 150–450)
POTASSIUM SERPL-SCNC: 4.7 MMOL/L (ref 3.4–5.3)
PR INTERVAL - MUSE: 240 MS
PROT SERPL-MCNC: 7.2 G/DL (ref 6.4–8.3)
QRS DURATION - MUSE: 86 MS
QT - MUSE: 330 MS
QTC - MUSE: 427 MS
R AXIS - MUSE: 59 DEGREES
RBC # BLD AUTO: 3.98 10E6/UL (ref 3.8–5.2)
RBC MORPH BLD: NORMAL
RBC URINE: 3 /HPF
RSV RNA SPEC NAA+PROBE: NEGATIVE
SARS-COV-2 RNA RESP QL NAA+PROBE: NEGATIVE
SODIUM SERPL-SCNC: 132 MMOL/L (ref 136–145)
SP GR UR STRIP: 1.02 (ref 1–1.03)
SQUAMOUS EPITHELIAL: <1 /HPF
SYSTOLIC BLOOD PRESSURE - MUSE: NORMAL MMHG
T AXIS - MUSE: 18 DEGREES
UROBILINOGEN UR STRIP-MCNC: 12 MG/DL
VENTRICULAR RATE- MUSE: 101 BPM
WBC # BLD AUTO: 4.8 10E3/UL (ref 4–11)
WBC URINE: 22 /HPF

## 2022-07-27 PROCEDURE — 96375 TX/PRO/DX INJ NEW DRUG ADDON: CPT

## 2022-07-27 PROCEDURE — 96367 TX/PROPH/DG ADDL SEQ IV INF: CPT

## 2022-07-27 PROCEDURE — 250N000013 HC RX MED GY IP 250 OP 250 PS 637: Performed by: EMERGENCY MEDICINE

## 2022-07-27 PROCEDURE — 120N000001 HC R&B MED SURG/OB

## 2022-07-27 PROCEDURE — C9803 HOPD COVID-19 SPEC COLLECT: HCPCS

## 2022-07-27 PROCEDURE — 87086 URINE CULTURE/COLONY COUNT: CPT | Performed by: EMERGENCY MEDICINE

## 2022-07-27 PROCEDURE — 36415 COLL VENOUS BLD VENIPUNCTURE: CPT | Performed by: EMERGENCY MEDICINE

## 2022-07-27 PROCEDURE — 250N000013 HC RX MED GY IP 250 OP 250 PS 637: Performed by: STUDENT IN AN ORGANIZED HEALTH CARE EDUCATION/TRAINING PROGRAM

## 2022-07-27 PROCEDURE — 250N000011 HC RX IP 250 OP 636: Performed by: EMERGENCY MEDICINE

## 2022-07-27 PROCEDURE — 82248 BILIRUBIN DIRECT: CPT | Performed by: EMERGENCY MEDICINE

## 2022-07-27 PROCEDURE — 86618 LYME DISEASE ANTIBODY: CPT | Performed by: EMERGENCY MEDICINE

## 2022-07-27 PROCEDURE — 96366 THER/PROPH/DIAG IV INF ADDON: CPT

## 2022-07-27 PROCEDURE — 86140 C-REACTIVE PROTEIN: CPT | Performed by: EMERGENCY MEDICINE

## 2022-07-27 PROCEDURE — 250N000011 HC RX IP 250 OP 636: Performed by: STUDENT IN AN ORGANIZED HEALTH CARE EDUCATION/TRAINING PROGRAM

## 2022-07-27 PROCEDURE — 99223 1ST HOSP IP/OBS HIGH 75: CPT | Mod: AI | Performed by: STUDENT IN AN ORGANIZED HEALTH CARE EDUCATION/TRAINING PROGRAM

## 2022-07-27 PROCEDURE — 76705 ECHO EXAM OF ABDOMEN: CPT

## 2022-07-27 PROCEDURE — 80053 COMPREHEN METABOLIC PANEL: CPT | Performed by: EMERGENCY MEDICINE

## 2022-07-27 PROCEDURE — 87637 SARSCOV2&INF A&B&RSV AMP PRB: CPT | Performed by: EMERGENCY MEDICINE

## 2022-07-27 PROCEDURE — 83605 ASSAY OF LACTIC ACID: CPT | Performed by: EMERGENCY MEDICINE

## 2022-07-27 PROCEDURE — 86617 LYME DISEASE ANTIBODY: CPT | Performed by: EMERGENCY MEDICINE

## 2022-07-27 PROCEDURE — 85025 COMPLETE CBC W/AUTO DIFF WBC: CPT | Performed by: EMERGENCY MEDICINE

## 2022-07-27 PROCEDURE — 96361 HYDRATE IV INFUSION ADD-ON: CPT

## 2022-07-27 PROCEDURE — 80048 BASIC METABOLIC PNL TOTAL CA: CPT | Performed by: EMERGENCY MEDICINE

## 2022-07-27 PROCEDURE — 71045 X-RAY EXAM CHEST 1 VIEW: CPT

## 2022-07-27 PROCEDURE — 96365 THER/PROPH/DIAG IV INF INIT: CPT

## 2022-07-27 PROCEDURE — 81003 URINALYSIS AUTO W/O SCOPE: CPT | Performed by: EMERGENCY MEDICINE

## 2022-07-27 PROCEDURE — 258N000003 HC RX IP 258 OP 636: Performed by: EMERGENCY MEDICINE

## 2022-07-27 PROCEDURE — 99285 EMERGENCY DEPT VISIT HI MDM: CPT | Mod: 25

## 2022-07-27 PROCEDURE — 87040 BLOOD CULTURE FOR BACTERIA: CPT | Performed by: EMERGENCY MEDICINE

## 2022-07-27 RX ORDER — ACETAMINOPHEN 325 MG/1
650 TABLET ORAL EVERY 6 HOURS PRN
Status: DISCONTINUED | OUTPATIENT
Start: 2022-07-27 | End: 2022-07-29 | Stop reason: HOSPADM

## 2022-07-27 RX ORDER — CEFTRIAXONE 2 G/1
2 INJECTION, POWDER, FOR SOLUTION INTRAMUSCULAR; INTRAVENOUS ONCE
Status: COMPLETED | OUTPATIENT
Start: 2022-07-27 | End: 2022-07-27

## 2022-07-27 RX ORDER — HYDROMORPHONE HYDROCHLORIDE 2 MG/1
2 TABLET ORAL EVERY 4 HOURS PRN
Status: DISCONTINUED | OUTPATIENT
Start: 2022-07-27 | End: 2022-07-29 | Stop reason: HOSPADM

## 2022-07-27 RX ORDER — POLYETHYLENE GLYCOL 3350 17 G/17G
17 POWDER, FOR SOLUTION ORAL DAILY PRN
Status: DISCONTINUED | OUTPATIENT
Start: 2022-07-27 | End: 2022-07-28

## 2022-07-27 RX ORDER — ACETAMINOPHEN 650 MG/1
650 SUPPOSITORY RECTAL EVERY 6 HOURS PRN
Status: DISCONTINUED | OUTPATIENT
Start: 2022-07-27 | End: 2022-07-29 | Stop reason: HOSPADM

## 2022-07-27 RX ORDER — ACETAMINOPHEN 500 MG
1000 TABLET ORAL ONCE
Status: COMPLETED | OUTPATIENT
Start: 2022-07-27 | End: 2022-07-27

## 2022-07-27 RX ORDER — ONDANSETRON 2 MG/ML
4 INJECTION INTRAMUSCULAR; INTRAVENOUS EVERY 6 HOURS PRN
Status: DISCONTINUED | OUTPATIENT
Start: 2022-07-27 | End: 2022-07-29 | Stop reason: HOSPADM

## 2022-07-27 RX ORDER — AZITHROMYCIN 500 MG/5ML
500 INJECTION, POWDER, LYOPHILIZED, FOR SOLUTION INTRAVENOUS ONCE
Status: COMPLETED | OUTPATIENT
Start: 2022-07-27 | End: 2022-07-27

## 2022-07-27 RX ORDER — ENOXAPARIN SODIUM 100 MG/ML
40 INJECTION SUBCUTANEOUS EVERY 24 HOURS
Status: DISCONTINUED | OUTPATIENT
Start: 2022-07-27 | End: 2022-07-29 | Stop reason: HOSPADM

## 2022-07-27 RX ORDER — CALCIUM GLUCONATE 20 MG/ML
2 INJECTION, SOLUTION INTRAVENOUS ONCE
Status: COMPLETED | OUTPATIENT
Start: 2022-07-27 | End: 2022-07-27

## 2022-07-27 RX ORDER — LIDOCAINE 40 MG/G
CREAM TOPICAL
Status: DISCONTINUED | OUTPATIENT
Start: 2022-07-27 | End: 2022-07-29 | Stop reason: HOSPADM

## 2022-07-27 RX ORDER — SODIUM CHLORIDE 9 MG/ML
INJECTION, SOLUTION INTRAVENOUS CONTINUOUS
Status: DISCONTINUED | OUTPATIENT
Start: 2022-07-27 | End: 2022-07-28

## 2022-07-27 RX ORDER — ONDANSETRON 4 MG/1
4 TABLET, ORALLY DISINTEGRATING ORAL EVERY 6 HOURS PRN
Status: DISCONTINUED | OUTPATIENT
Start: 2022-07-27 | End: 2022-07-29 | Stop reason: HOSPADM

## 2022-07-27 RX ORDER — CALCIUM GLUCONATE 94 MG/ML
2 INJECTION, SOLUTION INTRAVENOUS ONCE
Status: DISCONTINUED | OUTPATIENT
Start: 2022-07-27 | End: 2022-07-27

## 2022-07-27 RX ORDER — CEFTRIAXONE 1 G/1
1 INJECTION, POWDER, FOR SOLUTION INTRAMUSCULAR; INTRAVENOUS EVERY 24 HOURS
Status: DISCONTINUED | OUTPATIENT
Start: 2022-07-28 | End: 2022-07-29 | Stop reason: HOSPADM

## 2022-07-27 RX ORDER — DOXYCYCLINE 100 MG/1
100 CAPSULE ORAL EVERY 12 HOURS SCHEDULED
Status: DISCONTINUED | OUTPATIENT
Start: 2022-07-27 | End: 2022-07-29 | Stop reason: HOSPADM

## 2022-07-27 RX ORDER — LEVOTHYROXINE SODIUM 112 UG/1
112 TABLET ORAL
Status: DISCONTINUED | OUTPATIENT
Start: 2022-07-28 | End: 2022-07-29 | Stop reason: HOSPADM

## 2022-07-27 RX ADMIN — CEFTRIAXONE 2 G: 2 INJECTION, POWDER, FOR SOLUTION INTRAMUSCULAR; INTRAVENOUS at 12:35

## 2022-07-27 RX ADMIN — SODIUM CHLORIDE 1000 ML: 9 INJECTION, SOLUTION INTRAVENOUS at 17:05

## 2022-07-27 RX ADMIN — DOXYCYCLINE HYCLATE 100 MG: 100 CAPSULE ORAL at 20:58

## 2022-07-27 RX ADMIN — SODIUM CHLORIDE: 9 INJECTION, SOLUTION INTRAVENOUS at 16:23

## 2022-07-27 RX ADMIN — SODIUM CHLORIDE 1000 ML: 9 INJECTION, SOLUTION INTRAVENOUS at 12:33

## 2022-07-27 RX ADMIN — SODIUM CHLORIDE 1000 ML: 9 INJECTION, SOLUTION INTRAVENOUS at 12:06

## 2022-07-27 RX ADMIN — CALCIUM GLUCONATE 2 G: 20 INJECTION, SOLUTION INTRAVENOUS at 16:53

## 2022-07-27 RX ADMIN — ENOXAPARIN SODIUM 40 MG: 40 INJECTION SUBCUTANEOUS at 19:48

## 2022-07-27 RX ADMIN — AZITHROMYCIN MONOHYDRATE 500 MG: 500 INJECTION, POWDER, LYOPHILIZED, FOR SOLUTION INTRAVENOUS at 13:52

## 2022-07-27 RX ADMIN — ACETAMINOPHEN 1000 MG: 500 TABLET, FILM COATED ORAL at 12:32

## 2022-07-27 RX ADMIN — ACETAMINOPHEN 650 MG: 325 TABLET, FILM COATED ORAL at 20:58

## 2022-07-27 ASSESSMENT — ACTIVITIES OF DAILY LIVING (ADL)
CHANGE_IN_FUNCTIONAL_STATUS_SINCE_ONSET_OF_CURRENT_ILLNESS/INJURY: NO
ADLS_ACUITY_SCORE: 35
FALL_HISTORY_WITHIN_LAST_SIX_MONTHS: NO
DIFFICULTY_COMMUNICATING: NO
VISION_MANAGEMENT: PRESCRIPTION
DRESSING/BATHING_DIFFICULTY: NO
HEARING_DIFFICULTY_OR_DEAF: NO
TOILETING_ISSUES: NO
DIFFICULTY_COMMUNICATING: NO
HEARING_DIFFICULTY_OR_DEAF: NO
ADLS_ACUITY_SCORE: 20
WEAR_GLASSES_OR_BLIND: YES
ADLS_ACUITY_SCORE: 20
DIFFICULTY_EATING/SWALLOWING: NO
DOING_ERRANDS_INDEPENDENTLY_DIFFICULTY: NO
WALKING_OR_CLIMBING_STAIRS_DIFFICULTY: NO
ADLS_ACUITY_SCORE: 35
CONCENTRATING,_REMEMBERING_OR_MAKING_DECISIONS_DIFFICULTY: NO

## 2022-07-27 ASSESSMENT — ENCOUNTER SYMPTOMS
DYSURIA: 0
DIARRHEA: 0
WEAKNESS: 1
SORE THROAT: 0
ABDOMINAL PAIN: 0
ARTHRALGIAS: 0
HEMATURIA: 0
DIFFICULTY URINATING: 0
CHILLS: 1
VOMITING: 0
FEVER: 1
DIAPHORESIS: 1
FREQUENCY: 0
APPETITE CHANGE: 1
BACK PAIN: 0
COUGH: 1

## 2022-07-27 NOTE — ED NOTES
[facility]  ED Nurse Handoff Report    Azul Dsouza is a 56 year old female   ED Chief complaint: Fever  . ED Diagnosis:   Final diagnoses:   Pneumonia of right lower lobe due to infectious organism     Allergies:   Allergies   Allergen Reactions     Sulfa Drugs Hives     PN: LW Reaction: HIVES       Code Status: Full Code  Activity level - Baseline/Home:  Independent. Activity Level - Current:   Assist X 1. Lift room needed: No. Bariatric: No   Needed: No   Isolation: No. Infection: Not Applicable.     Vital Signs:   Vitals:    07/27/22 1320 07/27/22 1330 07/27/22 1340 07/27/22 1354   BP: 98/60 103/61     Pulse: 93 91 90    Resp: 10 14 19    Temp:    98.5  F (36.9  C)   TempSrc:    Oral   SpO2: 93% 95% 92%        Cardiac Rhythm:  ,      Pain level:    Patient confused: No. Patient Falls Risk: Yes.   Elimination Status: Has voided   Patient Report - Initial Complaint: Fever.   Focused Assessment:       HPI   Azul Dsouza is a 56 year old female who presents with generalized weakness, fever, chills, headache, cough, and diaphoresis the last four days. Maximum temperature at home was 103.7 F and unable to break below 100 F even after taking Tylenol. Last took Tylenol at 0300 this morning. She also notes having a decrease appetite. Patient says she was recently exposed to a family member with pneumonia. She was also bit by a deer tick one month ago on the back of her leg. She states she got the non-engorged entire tick out and did not notice any rashes around the area. Denies vomiting, diarrhea, sore throat, and arthralgias. No chest, back, or abdominal pain. No urinary symptoms. She denies any other sick contacts. Patient is fully vaccinated for COVID-19. No history of IV drug use. History of kidney stones.    Tests Performed: labs, imaging. Abnormal Results:   Labs Ordered and Resulted from Time of ED Arrival to Time of ED Departure   BASIC METABOLIC PANEL - Abnormal       Result  Value    Creatinine 0.76      Sodium 132 (*)     Potassium 4.7      Urea Nitrogen 10.1      Chloride 98      Carbon Dioxide (CO2) 26      Anion Gap 8      Glucose 105 (*)     GFR Estimate >90      Calcium 8.2 (*)    ROUTINE UA WITH MICROSCOPIC REFLEX TO CULTURE - Abnormal    Color Urine Yellow      Appearance Urine Slightly Cloudy (*)     Glucose Urine Negative      Bilirubin Urine Negative      Ketones Urine 10  (*)     Specific Gravity Urine 1.022      Blood Urine Trace (*)     pH Urine 6.5      Protein Albumin Urine 50  (*)     Urobilinogen Urine 12.0 (*)     Nitrite Urine Negative      Leukocyte Esterase Urine Small (*)     Bacteria Urine Few (*)     Mucus Urine Present (*)     RBC Urine 3 (*)     WBC Urine 22 (*)     Squamous Epithelials Urine <1     CRP INFLAMMATION - Abnormal    CRP Inflammation 125.84 (*)    HEPATIC FUNCTION PANEL - Abnormal    Protein Total 7.2      Albumin 3.2 (*)     Bilirubin Total 0.7      Alkaline Phosphatase 202 (*)      (*)     ALT 75 (*)     Bilirubin Direct <0.20     LACTIC ACID WHOLE BLOOD - Normal    Lactic Acid 1.2     INFLUENZA A/B & SARS-COV2 PCR MULTIPLEX - Normal    Influenza A PCR Negative      Influenza B PCR Negative      RSV PCR Negative      SARS CoV2 PCR Negative     CBC WITH PLATELETS AND DIFFERENTIAL    WBC Count 4.8      RBC Count 3.98      Hemoglobin 11.7      Hematocrit 36.8      MCV 93      MCH 29.4      MCHC 31.8      RDW 13.5      Platelet Count 201      % Neutrophils 62      % Lymphocytes 29      % Monocytes 8      % Eosinophils 0      % Basophils 0      % Immature Granulocytes 1      NRBCs per 100 WBC 0      Absolute Neutrophils 3.0      Absolute Lymphocytes 1.4      Absolute Monocytes 0.4      Absolute Eosinophils 0.0      Absolute Basophils 0.0      Absolute Immature Granulocytes 0.0      Absolute NRBCs 0.0     RBC AND PLATELET MORPHOLOGY    Platelet Assessment        Value: Automated Count Confirmed. Platelet morphology is normal.    RBC Morphology  Confirmed RBC Indices     LYME DISEASE TOTAL ABS BLD WITH REFLEX TO CONFIRM CLIA   BLOOD CULTURE   BLOOD CULTURE   URINE CULTURE     US Abdomen Limited (RUQ)   Final Result   IMPRESSION:   1.  1.2 cm echogenic focus in the upper pole of the right kidney is   suspicious for nonobstructing stone.   2.  No other cause for right upper quadrant pain is identified. No   gallstones are identified.      JUSTIN CAGLE MD            SYSTEM ID:  [id number]      XR Chest Port 1 View   Final Result   IMPRESSION: Trace left pleural effusion and mild basilar interstitial   opacities, could represent pneumonia. The right lung is clear. No   pneumothorax. Normal heart size.      ERASMO MACARIO MD            SYSTEM ID:  [id number]        .   Treatments provided: See MAR  Family Comments: NA  OBS brochure/video discussed/provided to patient:  N/A  ED Medications:   Medications   0.9% sodium chloride BOLUS (0 mLs Intravenous Stopped 7/27/22 1353)   0.9% sodium chloride BOLUS (0 mLs Intravenous Stopped 7/27/22 1353)   cefTRIAXone (ROCEPHIN) 2 g vial to attach to  ml bag for ADULTS or NS 50 ml bag for PEDS (0 g Intravenous Stopped 7/27/22 1313)   acetaminophen (TYLENOL) tablet 1,000 mg (1,000 mg Oral Given 7/27/22 1232)   azithromycin 500 mg (ZITHROMAX) in 0.9% NaCl 250 mL intermittent infusion 500 mg (0 mg Intravenous Stopped 7/27/22 1543)     Drips infusing:  No  For the majority of the shift, the patient's behavior Green. Interventions performed were NA.    Sepsis treatment initiated: No     Patient tested for COVID 19 prior to admission: YES    ED Nurse Name/Phone Number: Courtney Fu RN,   3:44 PM  RECEIVING UNIT ED HANDOFF REVIEW    Above ED Nurse Handoff Report was reviewed: Yes  Reviewed by: Jo Jimenez RN on July 27, 2022 at 6:49 PM

## 2022-07-27 NOTE — ED TRIAGE NOTES
Fever, weakness, chills shakes and headache since Sunday. T as high as 103.7 at home, unable to break below 100. Recently bit by deer tick and recently around family member with pneumonia. VSS on RA.

## 2022-07-27 NOTE — ED PROVIDER NOTES
History   Chief Complaint:  Fever       HPI   Azul Dsouza is a 56 year old female who presents with generalized weakness, fever, chills, headache, cough, and diaphoresis the last four days. Maximum temperature at home was 103.7 F and unable to break below 100 F even after taking Tylenol. Last took Tylenol at 0300 this morning. She also notes having a decrease appetite. Patient says she was recently exposed to a family member with pneumonia. She was also bit by a deer tick one month ago on the back of her leg. She states she got the non-engorged entire tick out and did not notice any rashes around the area. Denies vomiting, diarrhea, sore throat, and arthralgias. No chest, back, or abdominal pain. No urinary symptoms. She denies any other sick contacts. Patient is fully vaccinated for COVID-19. No history of IV drug use. History of kidney stones.       Review of Systems   Constitutional: Positive for appetite change, chills, diaphoresis and fever.   HENT: Negative for sore throat.    Respiratory: Positive for cough.    Cardiovascular: Negative for chest pain.   Gastrointestinal: Negative for abdominal pain, diarrhea and vomiting.   Genitourinary: Negative for decreased urine volume, difficulty urinating, dysuria, frequency, hematuria and urgency.   Musculoskeletal: Negative for arthralgias and back pain.   Neurological: Positive for weakness.   All other systems reviewed and are negative.      Allergies:  Sulfa Drugs    Medications:  Synthroid  Pulmicort flexhaler     Past Medical History:     Hypothyroidism    Calculus of kidney     Past Surgical History:     section x2  Laparoscopic tubal ligation     Family History:    Father: Depression  Sister: Depression  Brother: Bipolar disorder    Social History:  The patient presents to the ED alone.  Arrives via private vehicle      Physical Exam     Patient Vitals for the past 24 hrs:   BP Temp Temp src Pulse Resp SpO2   22 1750 -- -- -- 80 17 95 %    07/27/22 1740 95/63 -- -- 79 20 94 %   07/27/22 1730 91/63 -- -- 82 24 95 %   07/27/22 1720 94/58 -- -- 82 12 95 %   07/27/22 1710 (!) 85/54 -- -- 82 19 95 %   07/27/22 1700 (!) 82/53 -- -- 82 17 95 %   07/27/22 1645 (!) 85/55 -- -- 86 21 95 %   07/27/22 1644 (!) 88/52 97.9  F (36.6  C) Oral 86 20 95 %   07/27/22 1630 (!) 86/56 -- -- 87 16 94 %   07/27/22 1615 92/54 -- -- 84 19 94 %   07/27/22 1600 91/57 -- -- 87 19 94 %   07/27/22 1550 98/64 -- -- 84 19 95 %   07/27/22 1545 -- -- -- 85 17 94 %   07/27/22 1540 -- -- -- 88 14 94 %   07/27/22 1530 95/66 -- -- 89 16 --   07/27/22 1520 94/62 -- -- 85 18 --   07/27/22 1510 -- -- -- 82 18 --   07/27/22 1440 -- -- -- 90 17 94 %   07/27/22 1430 92/57 -- -- 86 17 93 %   07/27/22 1420 94/59 -- -- 89 18 94 %   07/27/22 1410 -- -- -- 92 12 95 %   07/27/22 1400 98/61 -- -- 89 15 93 %   07/27/22 1354 -- 98.5  F (36.9  C) Oral -- -- --   07/27/22 1340 -- -- -- 90 19 92 %   07/27/22 1330 103/61 -- -- 91 14 95 %   07/27/22 1320 98/60 -- -- 93 10 93 %   07/27/22 1315 (!) 89/57 -- -- 95 14 93 %   07/27/22 1314 -- 99.2  F (37.3  C) Oral -- -- --   07/27/22 1310 -- -- -- 96 19 94 %   07/27/22 1300 -- -- -- 98 20 93 %   07/27/22 1245 98/56 -- -- 97 15 93 %   07/27/22 1230 -- -- -- 101 -- 93 %   07/27/22 1220 105/57 -- -- 101 19 93 %   07/27/22 1210 -- -- -- 102 14 94 %   07/27/22 1200 92/53 -- -- 103 20 93 %   07/27/22 1145 97/56 -- -- 105 19 93 %   07/27/22 1130 -- -- -- 106 19 92 %   07/27/22 1120 105/64 -- -- 104 -- 94 %   07/27/22 1115 100/60 -- -- -- -- --   07/27/22 0942 118/63 100.2  F (37.9  C) Temporal 109 19 98 %       Physical Exam  Vitals and nursing note reviewed.   Constitutional:       General: She is not in acute distress.     Appearance: Normal appearance. She is ill-appearing.   HENT:      Head: Normocephalic and atraumatic.      Right Ear: External ear normal.      Left Ear: External ear normal.   Eyes:      Extraocular Movements: Extraocular movements intact.       Conjunctiva/sclera: Conjunctivae normal.   Cardiovascular:      Rate and Rhythm: Regular rhythm. Tachycardia present.      Heart sounds: No murmur heard.  Pulmonary:      Effort: Pulmonary effort is normal. No respiratory distress.      Breath sounds: Normal breath sounds. No wheezing, rhonchi or rales.   Abdominal:      General: Abdomen is flat. Bowel sounds are normal. There is no distension.      Palpations: Abdomen is soft.      Tenderness: There is abdominal tenderness (mild) in the right upper quadrant. There is no right CVA tenderness, left CVA tenderness, guarding or rebound. Negative signs include Arnett's sign and McBurney's sign.   Musculoskeletal:         General: No deformity or signs of injury.      Cervical back: Normal range of motion and neck supple. No rigidity.   Skin:     General: Skin is warm and dry.      Findings: No rash.   Neurological:      Mental Status: She is alert and oriented to person, place, and time.   Psychiatric:         Behavior: Behavior normal.           Emergency Department Course   ECG  ECG results from 07/27/22   EKG 12-lead, tracing only     Value    Ventricular Rate 101    Atrial Rate 101    CO Interval 240    QRS Duration 86        QTc 427    P Axis 45    R AXIS 59    T Axis 18    Interpretation ECG      Sinus tachycardia with 1st degree A-V block  Nonspecific T wave abnormality  Abnormal ECG  No ECGs for comparison        Imaging:  US Abdomen Limited (RUQ)   Final Result   IMPRESSION:   1.  1.2 cm echogenic focus in the upper pole of the right kidney is   suspicious for nonobstructing stone.   2.  No other cause for right upper quadrant pain is identified. No   gallstones are identified.      JUSTIN CAGLE MD            SYSTEM ID:  [id number]      XR Chest Port 1 View   Final Result   IMPRESSION: Trace left pleural effusion and mild basilar interstitial   opacities, could represent pneumonia. The right lung is clear. No   pneumothorax. Normal heart size.       ERASMO MACARIO MD            SYSTEM ID:  [id number]        Report per radiology    Laboratory:  Labs Ordered and Resulted from Time of ED Arrival to Time of ED Departure   BASIC METABOLIC PANEL - Abnormal       Result Value    Creatinine 0.76      Sodium 132 (*)     Potassium 4.7      Urea Nitrogen 10.1      Chloride 98      Carbon Dioxide (CO2) 26      Anion Gap 8      Glucose 105 (*)     GFR Estimate >90      Calcium 8.2 (*)    ROUTINE UA WITH MICROSCOPIC REFLEX TO CULTURE - Abnormal    Color Urine Yellow      Appearance Urine Slightly Cloudy (*)     Glucose Urine Negative      Bilirubin Urine Negative      Ketones Urine 10  (*)     Specific Gravity Urine 1.022      Blood Urine Trace (*)     pH Urine 6.5      Protein Albumin Urine 50  (*)     Urobilinogen Urine 12.0 (*)     Nitrite Urine Negative      Leukocyte Esterase Urine Small (*)     Bacteria Urine Few (*)     Mucus Urine Present (*)     RBC Urine 3 (*)     WBC Urine 22 (*)     Squamous Epithelials Urine <1     CRP INFLAMMATION - Abnormal    CRP Inflammation 125.84 (*)    HEPATIC FUNCTION PANEL - Abnormal    Protein Total 7.2      Albumin 3.2 (*)     Bilirubin Total 0.7      Alkaline Phosphatase 202 (*)      (*)     ALT 75 (*)     Bilirubin Direct <0.20     LACTIC ACID WHOLE BLOOD - Normal    Lactic Acid 1.2     INFLUENZA A/B & SARS-COV2 PCR MULTIPLEX - Normal    Influenza A PCR Negative      Influenza B PCR Negative      RSV PCR Negative      SARS CoV2 PCR Negative     LACTIC ACID WHOLE BLOOD - Normal    Lactic Acid 0.9     CBC WITH PLATELETS AND DIFFERENTIAL    WBC Count 4.8      RBC Count 3.98      Hemoglobin 11.7      Hematocrit 36.8      MCV 93      MCH 29.4      MCHC 31.8      RDW 13.5      Platelet Count 201      % Neutrophils 62      % Lymphocytes 29      % Monocytes 8      % Eosinophils 0      % Basophils 0      % Immature Granulocytes 1      NRBCs per 100 WBC 0      Absolute Neutrophils 3.0      Absolute Lymphocytes 1.4      Absolute  Monocytes 0.4      Absolute Eosinophils 0.0      Absolute Basophils 0.0      Absolute Immature Granulocytes 0.0      Absolute NRBCs 0.0     RBC AND PLATELET MORPHOLOGY    Platelet Assessment        Value: Automated Count Confirmed. Platelet morphology is normal.    RBC Morphology Confirmed RBC Indices     LYME DISEASE TOTAL ABS BLD WITH REFLEX TO CONFIRM CLIA   BLOOD CULTURE   BLOOD CULTURE   URINE CULTURE          Emergency Department Course:             Reviewed:  I reviewed nursing notes, vitals, past medical history and Care Everywhere    Assessments:  1206 I obtained history and examined the patient as noted above.     1620 I rechecked the patient and explained findings.     Consults:  1538 I spoke with Dr. Fischer, hospitalist, who accepts the patient.    Interventions:  1206 NS  1L  IV  1232 Tylenol  1,000 mg  PO  1233 NS  1L   IV  1235 Rocephin  2 g  IV  1352 Zithromax  500 mg  IV  1623 NS  1L  IV  1653 Calcium gluconate 2 g in 100 mL NS  IV    Disposition:  The patient was admitted to the hospital under the care of Dr. Fischer.     Impression & Plan         Medical Decision Makin-year-old female presenting with fever, headache, cough, weakness for the past 4 days.  Differential diagnosis includes pneumonia, urosepsis, viral illness such as COVID.  No meningismus to suggest CNS infection or meningitis.  She has mild right upper quadrant tenderness, but low suspicion for cholecystitis.  Did have a tick bite a month ago, however it was removed intact and not engorged, and she had no rashes in the area so low suspicion for Lyme or other tickborne illness.  She is borderline hypotensive on arrival to the ED.  Will check septic work-up and give empiric antibiotics and likely admit given her borderline blood pressure.    1812 patient's work-up is mostly unremarkable.  She has no leukocytosis but her CRP is significantly elevated.  Her LFTs are mildly elevated as well.  Her UA is negative.  Her right upper  quadrant ultrasound shows no signs of cholecystitis.  Her chest x-ray does suggest signs of pneumonia.  Suspect this is the etiology of her symptoms.  Her lactic acid is normal and her heart rate is normal.  Her blood pressure remains soft.  She was given a dose of ceftriaxone and azithromycin for presumed pneumonia.  She has been given 2 L of IV fluids and is getting third liter.  She was given calcium gluconate for hypocalcemia and in hopes that this would improve her contractility and therefore her blood pressure.  I discussed with Dr. Fischer who came to see the patient in the ED.  He agrees the patient is well-appearing and does not appear to need a ICU bed or vasopressors at this point despite her blood pressure.  He recommends rechecking a lactic acid and giving the third liter of fluids and if her lactic remained stable she can likely be admitted to the floor.      Covid-19  Azul Dsouza was evaluated during a global COVID-19 pandemic, which necessitated consideration that the patient might be at risk for infection with the SARS-CoV-2 virus that causes COVID-19.   Applicable protocols for evaluation were followed during the patient's care.   COVID-19 was considered as part of the patient's evaluation. The plan for testing is: a test was obtained during this visit.    COVID-19 Virus (Coronavirus) by PCR Nasopharyngeal Swab: Negative      Diagnosis:    ICD-10-CM    1. Pneumonia of left lower lobe due to infectious organism  J18.9          Scribe Disclosure:  GUS, Cecy Katz, am serving as a scribe at 12:05 PM on 7/27/2022 to document services personally performed by Romel Freed MD based on my observations and the provider's statements to me.            Romel Freed MD  07/27/22 1814       Romel Freed MD  07/27/22 1815

## 2022-07-27 NOTE — PHARMACY-ADMISSION MEDICATION HISTORY
Admission medication history interview status for this patient is complete. See [facility] admission navigator for allergy information, prior to admission medications and immunization status.     Medication history interview done, indicate source(s): Patient  Medication history resources (including written lists, pill bottles, clinic record):None  Pharmacy: -    Changes made to PTA medication list:  Added: -  Changed: voltaren gel to prn  Reported as Not Taking: -  Removed: -    Actions taken by pharmacist (provider contacted, etc):None     Additional medication history information:None    Medication reconciliation/reorder completed by provider prior to medication history?  no   (Y/N)     For patients on insulin therapy:   Do you use sliding scale insulin based on blood sugars?   What is your pre-meal insulin coverage?    Do you typically eat three meals a day?   How many times do you check your blood glucose per day?   How many episodes of hypoglycemia do you typically have per month?   Do you have a Continuous Glucose Monitor (CGM)?      Prior to Admission medications    Medication Sig Last Dose Taking? Auth Provider Long Term End Date   diclofenac (VOLTAREN) 1 % topical gel Apply 4 g topically 4 times daily  Patient taking differently: Apply 4 g topically 4 times daily as needed  Yes Nando Honeycutt PA-C     levothyroxine (SYNTHROID/LEVOTHROID) 112 MCG tablet Take 1 tablet (112 mcg) by mouth daily 7/27/2022 at Unknown time Yes Nando Honeycutt PA-C Yes    PULMICORT FLEXHALER 180 MCG/ACT inhaler Inhale 1 puff into the lungs 2 times daily as needed  Yes Reported, Patient Yes

## 2022-07-28 LAB
ANION GAP SERPL CALCULATED.3IONS-SCNC: 5 MMOL/L (ref 7–15)
BUN SERPL-MCNC: 8.7 MG/DL (ref 6–20)
CALCIUM SERPL-MCNC: 7.5 MG/DL (ref 8.6–10)
CHLORIDE SERPL-SCNC: 105 MMOL/L (ref 98–107)
CREAT SERPL-MCNC: 0.68 MG/DL (ref 0.51–0.95)
DEPRECATED HCO3 PLAS-SCNC: 25 MMOL/L (ref 22–29)
ERYTHROCYTE [DISTWIDTH] IN BLOOD BY AUTOMATED COUNT: 13.9 % (ref 10–15)
GFR SERPL CREATININE-BSD FRML MDRD: >90 ML/MIN/1.73M2
GLUCOSE SERPL-MCNC: 97 MG/DL (ref 70–99)
HCT VFR BLD AUTO: 30 % (ref 35–47)
HGB BLD-MCNC: 9.5 G/DL (ref 11.7–15.7)
MCH RBC QN AUTO: 29.8 PG (ref 26.5–33)
MCHC RBC AUTO-ENTMCNC: 31.7 G/DL (ref 31.5–36.5)
MCV RBC AUTO: 94 FL (ref 78–100)
PLATELET # BLD AUTO: 176 10E3/UL (ref 150–450)
POTASSIUM SERPL-SCNC: 4 MMOL/L (ref 3.4–5.3)
RBC # BLD AUTO: 3.19 10E6/UL (ref 3.8–5.2)
SODIUM SERPL-SCNC: 135 MMOL/L (ref 136–145)
WBC # BLD AUTO: 4.8 10E3/UL (ref 4–11)

## 2022-07-28 PROCEDURE — 258N000003 HC RX IP 258 OP 636: Performed by: STUDENT IN AN ORGANIZED HEALTH CARE EDUCATION/TRAINING PROGRAM

## 2022-07-28 PROCEDURE — 80048 BASIC METABOLIC PNL TOTAL CA: CPT | Performed by: STUDENT IN AN ORGANIZED HEALTH CARE EDUCATION/TRAINING PROGRAM

## 2022-07-28 PROCEDURE — 120N000001 HC R&B MED SURG/OB

## 2022-07-28 PROCEDURE — 250N000013 HC RX MED GY IP 250 OP 250 PS 637: Performed by: STUDENT IN AN ORGANIZED HEALTH CARE EDUCATION/TRAINING PROGRAM

## 2022-07-28 PROCEDURE — 250N000013 HC RX MED GY IP 250 OP 250 PS 637: Performed by: HOSPITALIST

## 2022-07-28 PROCEDURE — 250N000011 HC RX IP 250 OP 636: Performed by: STUDENT IN AN ORGANIZED HEALTH CARE EDUCATION/TRAINING PROGRAM

## 2022-07-28 PROCEDURE — 85027 COMPLETE CBC AUTOMATED: CPT | Performed by: STUDENT IN AN ORGANIZED HEALTH CARE EDUCATION/TRAINING PROGRAM

## 2022-07-28 PROCEDURE — 36415 COLL VENOUS BLD VENIPUNCTURE: CPT | Performed by: STUDENT IN AN ORGANIZED HEALTH CARE EDUCATION/TRAINING PROGRAM

## 2022-07-28 PROCEDURE — 99232 SBSQ HOSP IP/OBS MODERATE 35: CPT | Performed by: HOSPITALIST

## 2022-07-28 RX ORDER — LOPERAMIDE HCL 2 MG
2 CAPSULE ORAL 4 TIMES DAILY PRN
Status: DISCONTINUED | OUTPATIENT
Start: 2022-07-28 | End: 2022-07-29 | Stop reason: HOSPADM

## 2022-07-28 RX ORDER — SACCHAROMYCES BOULARDII 250 MG
250 CAPSULE ORAL 2 TIMES DAILY
Status: DISCONTINUED | OUTPATIENT
Start: 2022-07-28 | End: 2022-07-29 | Stop reason: HOSPADM

## 2022-07-28 RX ADMIN — DOXYCYCLINE HYCLATE 100 MG: 100 CAPSULE ORAL at 08:20

## 2022-07-28 RX ADMIN — CEFTRIAXONE SODIUM 1 G: 1 INJECTION, POWDER, FOR SOLUTION INTRAMUSCULAR; INTRAVENOUS at 11:15

## 2022-07-28 RX ADMIN — SODIUM CHLORIDE: 9 INJECTION, SOLUTION INTRAVENOUS at 02:46

## 2022-07-28 RX ADMIN — Medication 250 MG: at 10:23

## 2022-07-28 RX ADMIN — LOPERAMIDE HYDROCHLORIDE 2 MG: 2 CAPSULE ORAL at 10:01

## 2022-07-28 RX ADMIN — ENOXAPARIN SODIUM 40 MG: 40 INJECTION SUBCUTANEOUS at 20:02

## 2022-07-28 RX ADMIN — DOXYCYCLINE HYCLATE 100 MG: 100 CAPSULE ORAL at 20:02

## 2022-07-28 RX ADMIN — LEVOTHYROXINE SODIUM 112 MCG: 0.11 TABLET ORAL at 06:38

## 2022-07-28 RX ADMIN — Medication 250 MG: at 20:02

## 2022-07-28 ASSESSMENT — ACTIVITIES OF DAILY LIVING (ADL)
ADLS_ACUITY_SCORE: 22
ADLS_ACUITY_SCORE: 20

## 2022-07-28 NOTE — PLAN OF CARE
Goal Outcome Evaluation:      nd of Shift Summary  For vital signs and complete assessments, please see documentation flowsheets.     Pertinent assessments: Pt A&OX4. VSS with soft BP and on RA. Had some head ache and tylenol x 1  with relief. LS dim and denies sob or chest pain.Has some infrequent non-productive cough. Able to ambulate to the BR sba. Tolerated diet and no c/o nausea.  Serum lyme disease elevated and MD notified.    Major Shift Events :Admitted tot he unit at 1900.    Treatment Plan: IVF,po and IV abx, monitor symptoms.

## 2022-07-28 NOTE — PLAN OF CARE
End of Shift Summary  For vital signs and complete assessments, please see documentation flowsheets.     Pertinent assessments: A&Ox4. BP's soft, all other VSS on room air. Denies SOB. LS dim. C/o slight headache, relieved with rest and ice. Up SBA. PIV SL.     Major Shift Events: Started immodium and probiotic from frequent loose stools.   Treatment Plan: po doxycycline, IV rocephin, monitor symptoms.  Bedside Nurse: Cecy Mercer RN

## 2022-07-28 NOTE — UTILIZATION REVIEW
Admission Status; Secondary Review Determination       Under the authority of the Utilization Management Committee, the utilization review process indicated a secondary review on the above patient. The review outcome is based on review of the medical records, discussions with staff, and applying clinical experience noted on the date of the review.     (x) Inpatient Status Appropriate - This patient's medical care is consistent with medical management for inpatient care and reasonable inpatient medical practice.     RATIONALE FOR DETERMINATION   56 year old female admitted on 7/27/2022.      She has prior medical history of hypothyroidism who started having fever up to 103.7  F starting Sunday (3 days ago).  She has mild nonproductive cough but denies shortness of breath.  She denies urinary symptoms.  She lives with her son who is not sick but was around her nephew last week who was sick.  She is vaccinated for COVID and boosted.  Patient requires inpatient admission versus short stay observation or outpatient treatment for the following reasons: Concern for hemodynamic instability with recurrent hypotension throughout the night in the 80s systolic tachycardia on admission and significantly elevated CRP  The expected length of stay at the time of admission was more than 2 nights because of the severity of illness, intensity of service provided, and risk for adverse outcome. Inpatient admission is appropriate.         This document was produced using voice recognition software       The information on this document is developed by the utilization review team in order for the business office to ensure compliance. This only denotes the appropriateness of proper admission status and does not reflect the quality of care rendered.   The definitions of Inpatient Status and Observation Status used in making the determination above are those provided in the CMS Coverage Manual, Chapter 1 and Chapter 6, section 70.4.    Sincerely,   CINTIA JUAREZ MD   System Medical Director   Utilization Management   [facility].

## 2022-07-28 NOTE — PLAN OF CARE
End of Shift Summary  For vital signs and complete assessments, please see documentation flowsheets.     Pertinent assessments: A&Ox4. BP's soft, all other VSS on room air. Denies SOB. LS dim. C/o slight headache, relieved with rest. Up SBA     Major Shift Events: None   Treatment Plan: IVF, po doxycycline, IV rocephin, monitor symptoms.  Bedside Nurse: Melody Guillen RN

## 2022-07-28 NOTE — PROGRESS NOTES
[facility]    Medicine Progress Note - Hospitalist Service    Date of Admission:  7/27/2022    Assessment & Plan         Azul Dsouza is a 56 year old female w/medical history of hypothyroidism who started having fever up to 103.7  F starting Sunday (3 days ago) and is now admitted for febrile illness with concern for lyme disease    Acute febrile illness  -initially thought to be CAP, although she does report occasional minimally productive cough her lyme panel is now mildly positive  -CXR with mild basilar interstitial opacities. Covid, influenza and other viral panel is negative  -cont doxy, rocephin and await confirmatory lyme testing. Consider streamlining regimen next 24 hours if blood cx negative    Hypotension  -improved after 2L IVF bolus in ED  -now drinking and BP improved so ok to stop fluids, monitor    Elevated LFTs  -AST//75 with alkaline phosphatase of 202 and normal bilirubin levels.  Unclear etiology, likely related to acute febrile illness.  Ultrasound does not show any cholecystitis.  -follow up labs in am    Mild hyponatremia  -improved, fluids stopped     Diet: Combination Diet Regular Diet Adult    DVT Prophylaxis: Enoxaparin (Lovenox) SQ  Ferreira Catheter: Not present  Central Lines: None  Cardiac Monitoring: None  Code Status: Full Code      Disposition Plan    likely next 24-48 hours, await cultures        The patient's care was discussed with the Patient.    Mike Pina,   Hospitalist Service  [facility]  Securely message with the Vocera Web Console (learn more here)  Text page via [employer] Paging/Directory   ______________________________________________________________________    Interval History   Fevers have resolved, having some loose stools/diarrhea. No nausea or vomiting, no abd pain. Lyme panel now mildly positive    Data reviewed today: I reviewed all medications, new labs and imaging results over the last 24  hours.    Physical Exam   Vital Signs: Temp: 98.1  F (36.7  C) Temp src: Oral BP: 104/63 Pulse: 75   Resp: 18 SpO2: 94 % O2 Device: None (Room air)    Weight: 164 lbs 0 oz  Constitutional: awake, alert and cooperative  Eyes: pupils equal, round and reactive to light and conjunctiva normal  ENT: normocepalic, without obvious abnormality, atramatic  Respiratory: no increased work of breathing, good air exchange and no retractions  Cardiovascular: regular rate and rhythm and no murmur noted  GI: normal bowel sounds, soft and non-distended  Skin: no bruising or bleeding  Neurologic: alert, oriented x4, no focal deficits    Data   Recent Labs   Lab 07/28/22  0624 07/27/22  1012   WBC 4.8 4.8   HGB 9.5* 11.7   MCV 94 93    201   * 132*   POTASSIUM 4.0 4.7   CHLORIDE 105 98   CO2 25 26   BUN 8.7 10.1   CR 0.68 0.76   ANIONGAP 5* 8   KIMBERLY 7.5* 8.2*   GLC 97 105*   ALBUMIN  --  3.2*   PROTTOTAL  --  7.2   BILITOTAL  --  0.7   ALKPHOS  --  202*   ALT  --  75*   AST  --  110*     Recent Results (from the past 24 hour(s))   XR Chest Port 1 View    Narrative    XR CHEST PORT 1 VIEW 7/27/2022 1:18 PM    HISTORY: cough, fever, hypotensive    COMPARISON: 2/15/2006      Impression    IMPRESSION: Trace left pleural effusion and mild basilar interstitial  opacities, could represent pneumonia. The right lung is clear. No  pneumothorax. Normal heart size.    ERASMO MACARIO MD         SYSTEM ID:  [id number]   US Abdomen Limited (RUQ)    Narrative    ULTRASOUND ABDOMEN LIMITED July 27, 2022 3:06 PM    CLINICAL HISTORY: Right upper quadrant pain. Elevated liver function  tests.    TECHNIQUE: Limited abdominal ultrasound.    COMPARISON: None.    FINDINGS:  GALLBLADDER: The gallbladder is unremarkable. No gallstones, wall  thickening, or pericholecystic fluid. Negative sonographic Arnett's  sign.    BILE DUCTS: There is no biliary dilatation. The common duct measures 3  mm. Portions of the common duct could not be visualized due  to  overlying bowel gas.    LIVER: Unremarkable. No evidence for fatty infiltration of the liver.  No focal hepatic masses.    RIGHT KIDNEY: Echogenic 1.2 cm shadowing focus in the upper pole of  the right kidney is suspicious for nonobstructing stone. A 1.9 cm cyst  in the lower pole of the right kidney would require no specific  follow-up. No hydronephrosis.    PANCREAS: The visualized portions of the pancreas are normal.    No ascites.      Impression    IMPRESSION:  1.  1.2 cm echogenic focus in the upper pole of the right kidney is  suspicious for nonobstructing stone.  2.  No other cause for right upper quadrant pain is identified. No  gallstones are identified.    JUSTIN CAGLE MD         SYSTEM ID:  [id number]     Medications     sodium chloride Stopped (07/28/22 1206)       cefTRIAXone  1 g Intravenous Q24H     doxycycline hyclate  100 mg Oral Q12H EJSICA (08/20)     enoxaparin ANTICOAGULANT  40 mg Subcutaneous Q24H     levothyroxine  112 mcg Oral QAM AC     saccharomyces boulardii  250 mg Oral BID     sodium chloride (PF)  3 mL Intracatheter Q8H

## 2022-07-29 VITALS
TEMPERATURE: 97.7 F | HEART RATE: 84 BPM | OXYGEN SATURATION: 96 % | HEIGHT: 65 IN | RESPIRATION RATE: 20 BRPM | BODY MASS INDEX: 27.32 KG/M2 | SYSTOLIC BLOOD PRESSURE: 106 MMHG | DIASTOLIC BLOOD PRESSURE: 66 MMHG | WEIGHT: 164 LBS

## 2022-07-29 LAB
ALBUMIN SERPL BCG-MCNC: 3 G/DL (ref 3.5–5.2)
ALP SERPL-CCNC: 176 U/L (ref 35–104)
ALT SERPL W P-5'-P-CCNC: 59 U/L (ref 10–35)
ANION GAP SERPL CALCULATED.3IONS-SCNC: 9 MMOL/L (ref 7–15)
AST SERPL W P-5'-P-CCNC: 90 U/L (ref 10–35)
B BURGDOR IGG SERPL QL IA: 0.08 INDEX
B BURGDOR IGG SERPL QL IA: NONREACTIVE
B BURGDOR IGM SERPL QL IA: 1.69 INDEX
B BURGDOR IGM SERPL QL IA: REACTIVE
BACTERIA UR CULT: NORMAL
BILIRUB SERPL-MCNC: 0.3 MG/DL
BUN SERPL-MCNC: 7.6 MG/DL (ref 6–20)
CALCIUM SERPL-MCNC: 8.1 MG/DL (ref 8.6–10)
CHLORIDE SERPL-SCNC: 102 MMOL/L (ref 98–107)
CREAT SERPL-MCNC: 0.7 MG/DL (ref 0.51–0.95)
DEPRECATED HCO3 PLAS-SCNC: 26 MMOL/L (ref 22–29)
ERYTHROCYTE [DISTWIDTH] IN BLOOD BY AUTOMATED COUNT: 13.9 % (ref 10–15)
GFR SERPL CREATININE-BSD FRML MDRD: >90 ML/MIN/1.73M2
GLUCOSE SERPL-MCNC: 113 MG/DL (ref 70–99)
HCT VFR BLD AUTO: 34.1 % (ref 35–47)
HGB BLD-MCNC: 10.7 G/DL (ref 11.7–15.7)
MCH RBC QN AUTO: 29.2 PG (ref 26.5–33)
MCHC RBC AUTO-ENTMCNC: 31.4 G/DL (ref 31.5–36.5)
MCV RBC AUTO: 93 FL (ref 78–100)
PLATELET # BLD AUTO: 243 10E3/UL (ref 150–450)
POTASSIUM SERPL-SCNC: 3.8 MMOL/L (ref 3.4–5.3)
PROT SERPL-MCNC: 6.7 G/DL (ref 6.4–8.3)
RBC # BLD AUTO: 3.66 10E6/UL (ref 3.8–5.2)
SODIUM SERPL-SCNC: 137 MMOL/L (ref 136–145)
WBC # BLD AUTO: 7.5 10E3/UL (ref 4–11)

## 2022-07-29 PROCEDURE — 84075 ASSAY ALKALINE PHOSPHATASE: CPT | Performed by: HOSPITALIST

## 2022-07-29 PROCEDURE — 80053 COMPREHEN METABOLIC PANEL: CPT | Performed by: HOSPITALIST

## 2022-07-29 PROCEDURE — 250N000013 HC RX MED GY IP 250 OP 250 PS 637: Performed by: STUDENT IN AN ORGANIZED HEALTH CARE EDUCATION/TRAINING PROGRAM

## 2022-07-29 PROCEDURE — 85014 HEMATOCRIT: CPT | Performed by: HOSPITALIST

## 2022-07-29 PROCEDURE — 99239 HOSP IP/OBS DSCHRG MGMT >30: CPT | Performed by: INTERNAL MEDICINE

## 2022-07-29 PROCEDURE — 36415 COLL VENOUS BLD VENIPUNCTURE: CPT | Performed by: HOSPITALIST

## 2022-07-29 PROCEDURE — 250N000013 HC RX MED GY IP 250 OP 250 PS 637: Performed by: HOSPITALIST

## 2022-07-29 RX ORDER — DOXYCYCLINE 100 MG/1
100 CAPSULE ORAL 2 TIMES DAILY
Qty: 16 CAPSULE | Refills: 0 | Status: SHIPPED | OUTPATIENT
Start: 2022-07-29 | End: 2023-01-12

## 2022-07-29 RX ADMIN — DOXYCYCLINE HYCLATE 100 MG: 100 CAPSULE ORAL at 08:26

## 2022-07-29 RX ADMIN — Medication 250 MG: at 08:26

## 2022-07-29 RX ADMIN — LEVOTHYROXINE SODIUM 112 MCG: 0.11 TABLET ORAL at 06:32

## 2022-07-29 ASSESSMENT — ACTIVITIES OF DAILY LIVING (ADL)
ADLS_ACUITY_SCORE: 22

## 2022-07-29 NOTE — PROGRESS NOTES
Pt hospitalized for fevers.  Discharge education provided to patient and patient verbalized understanding of medications and orders.  Medications sent to [facility] pharmacy and education provided on antibiotic at discharge.   Pt verbalized will follow up with primary care provider.  Patient discharging to home  And transportation provided by son. No further questions at this time.

## 2022-07-29 NOTE — DISCHARGE SUMMARY
"[facility]  Discharge Summary    Admit date:  7/27/2022    Discharge date and time: 07/29/22 0944    Discharge Physician: Julius Mobley MD    Primary care provider: Cintia Marcus [place]    Primary Discharge Diagnosis      Community Acquired PNA    Secondary Diagnoses     Suspected Lyme's Disease  Hypotension  Elevated LFTs  Hyponatremia    Summary of Hospital Stay     56F with hx of hypothyroidism presented with fevers and found to have community acquired PNA. Patient also had Lyme's testing that was indeterminate (confirmatory testing is pending). Patient will be discharged on a course of doxycyline to cover for both of these conditions and follow-up with his PCP.    Patient Discharge Condition & Exam     Discharge condition: Improved    /66 (BP Location: Left arm)   Pulse 84   Temp 97.7  F (36.5  C) (Oral)   Resp 20   Ht 1.65 m (5' 4.96\")   Wt 74.4 kg (164 lb)   LMP  (LMP Unknown)   SpO2 96%   BMI 27.32 kg/m       General: In NAD.  Cardiac: RRR.  Lungs: CTAB.  Abd: Non-tender.  Ext: No edema.    Discharge Instructions     Patient/family instructions: Written discharge instruction given to patient/family    Discharge Medications:     Review of your medicines      START taking      Dose / Directions   doxycycline hyclate 100 MG capsule  Commonly known as: VIBRAMYCIN  Used for: Pneumonia of left lower lobe due to infectious organism      Dose: 100 mg  Take 1 capsule (100 mg) by mouth 2 times daily  Quantity: 16 capsule  Refills: 0        CONTINUE these medicines which may have CHANGED, or have new prescriptions. If we are uncertain of the size of tablets/capsules you have at home, strength may be listed as something that might have changed.      Dose / Directions   diclofenac 1 % topical gel  Commonly known as: VOLTAREN  This may have changed:     when to take this    reasons to take this  Used for: Neck pain      Dose: 4 g  Apply 4 g topically 4 times daily  Quantity: 100 " g  Refills: 3        CONTINUE these medicines which have NOT CHANGED      Dose / Directions   levothyroxine 112 MCG tablet  Commonly known as: SYNTHROID/LEVOTHROID  Used for: Acquired hypothyroidism      Dose: 112 mcg  Take 1 tablet (112 mcg) by mouth daily  Quantity: 90 tablet  Refills: 3     Pulmicort Flexhaler 180 MCG/ACT inhaler  Generic drug: budesonide      Dose: 1 puff  Inhale 1 puff into the lungs 2 times daily as needed  Refills: 0           Where to get your medicines      These medications were sent to [id number] - [place] [id number] [address]    Phone: [phone]     doxycycline hyclate 100 MG capsule        Discharge diet: Regular    Discharge activity: Activity as tolerated    Discharge follow-up:    Follow up with primary care provider within one week or earlier if symptoms return or gets worse.    Follow up with consultant as instructed.    Pending Results     Unresulted Labs Ordered in the Past 30 Days of this Admission     Date and Time Order Name Status Description    7/29/2022 12:02 AM Comprehensive metabolic panel Preliminary     7/27/2022  7:54 PM LYME CONFIRM IGG/IGM BY CHEMILUMINESCENT IA BLOOD In process     7/27/2022 12:18 PM Blood Culture Wrist, Left Preliminary     7/27/2022 12:17 PM Blood Culture Line, venous Preliminary            Patient Allergies     Allergies   Allergen Reactions     Sulfa Drugs Hives     PN: LW Reaction: HIVES     Disposition   Disposition: Home     I saw and evaluated the patient on day of discharge and  discharge instructions reviewed  and  all the patient's questions and concerns addressed. Over 30 minutes spent on discharge and coordination of discharge process for this patient.

## 2022-07-29 NOTE — PLAN OF CARE
Pt is alert and oriented x4, independent with transfers and ADLs. C/o headache, per pt ice to back of the neck is effective. No loose stools since 7 pm. SL to R ac. Oral abx given per order, pt is on Lovenox, no s/sx of bleeding. No SOB, VSS on RA. Waiting on blood culture result. Plan to discharge home.

## 2022-07-29 NOTE — PLAN OF CARE
End of Shift Summary  For vital signs and complete assessments, please see documentation flowsheets.     Pertinent assessments: A&Ox4. VSS on room air, afebrile. Denies pain, SOB, N/V. Up independently     Major Shift Events: None  Treatment Plan: po doxycycline, IV rocephin, monitor symptoms.  Bedside Nurse: Melody Guillen RN

## 2022-07-30 DIAGNOSIS — Z71.89 OTHER SPECIFIED COUNSELING: ICD-10-CM

## 2022-07-31 ENCOUNTER — PATIENT OUTREACH (OUTPATIENT)
Dept: CARE COORDINATION | Facility: CLINIC | Age: 57
End: 2022-07-31

## 2022-07-31 NOTE — PROGRESS NOTES
Clinic Care Coordination Contact  [facility]/Voicemail    Clinical Data: Care Coordinator Outreach  Outreach attempted x 1. Left message on patient's voicemail with call back information and requested return call.     Plan:Care Coordinator will try to reach patient again in 1-2 business days.    DAVID Naranjo  [phone]  [facility]

## 2022-08-01 LAB
BACTERIA BLD CULT: NO GROWTH
BACTERIA BLD CULT: NO GROWTH

## 2022-08-01 NOTE — PROGRESS NOTES
Clinic Care Coordination Contact  [facility]: Post-Discharge Note  SITUATION                                                      Admission:    Admission Date: 07/27/22   Reason for Admission: fever, community acquired pneumonia  Discharge:   Discharge Date: 07/29/22  Discharge Diagnosis: Community Acquired PNA, Suspected Lyme's Disease,  Hypotension,  Elevated LFTs,  Hyponatremia    BACKGROUND                                                      Per hospital discharge summary and inpatient provider notes:    Azul Dsouza is a 56 year old female admitted on 7/27/2022.      She has prior medical history of hypothyroidism who started having fever up to 103.7  F starting Sunday (3 days ago).  She has mild nonproductive cough but denies shortness of breath.  She denies urinary symptoms.  She lives with her son who is not sick but was around her nephew last week who was sick.  She is vaccinated for COVID and boosted.  She denies nausea or vomiting but has mild diffuse abdominal pain which she attributes to coughing.  No urinary or GI complaints.  She did have a tick bite about a month ago but tick was not engorged.  She did not have any skin lesions.     Upon arrival temperature is 99.2  F, heart rate 103, blood pressure of 92/53, respiratory rate of 20 and oxygen saturation of 93% on room air.  Her lactate was 1.2.  She was given 2 L of IV normal saline, ceftriaxone and azithromycin IV in the ER but her blood pressure continues to be in systolic 80s and spite of her lactate decreasing further to 0.9.     Her chest x-ray showed trace left pleural effusion with mild basilar interstitial opacities, likely pneumonia.  Ultrasound abdomen did not show any gallstones or evidence of cholecystitis.  He tested negative for COVID-19, influenza A/B and RSV.  Normal WBC count of 4.8 and BMP was unremarkable.  Slightly elevated AST/ALT of 110/75 and alkaline phosphatase of 202.  CRP was 125.  Lyme's disease serology was  "sent out.    ASSESSMENT      Enrollment  Primary Care Care Coordination Status: Not a Candidate (Juliette stated she does not see a primary care provider. See miguel scheduling a hospital follow up appointment or annual wellness exam and is therefore not a candidtate for CC at this time)    Discharge Assessment  How are you doing now that you are home?: \"Every day gets better\"  How are your symptoms? (Red Flag symptoms escalate to triage hotline per guidelines): Improved  Do you feel your condition is stable enough to be safe at home until your provider visit?: Yes  Does the patient have their discharge instructions? : Yes  Does the patient have questions regarding their discharge instructions? : No  Were you started on any new medications or were there changes to any of your previous medications? : Yes  Does the patient have all of their medications?: Yes  Do you have questions regarding any of your medications? : No  Do you have all of your needed medical supplies or equipment (DME)?  (i.e. oxygen tank, CPAP, cane, etc.): Yes  Discharge follow-up appointment scheduled within 14 calendar days? : No  Is patient agreeable to assistance with scheduling? : No    Post-op ([facility] CTA Only)  If the patient had a surgery or procedure, do they have any questions for a nurse?: No    PLAN                                                      Outpatient Plan:      Follow up with primary care provider within one week or earlier if symptoms get worse.    No future appointments.      For any urgent concerns, please contact our 24 hour nurse triage line: [phone] ([phone])       Amita Santos  Community Health Worker  [facility]  Ph: [phone]    "

## 2022-10-16 ENCOUNTER — HEALTH MAINTENANCE LETTER (OUTPATIENT)
Age: 57
End: 2022-10-16

## 2023-01-12 ENCOUNTER — TELEPHONE (OUTPATIENT)
Dept: INTERNAL MEDICINE | Facility: CLINIC | Age: 58
End: 2023-01-12

## 2023-01-12 ENCOUNTER — ANCILLARY PROCEDURE (OUTPATIENT)
Dept: GENERAL RADIOLOGY | Facility: CLINIC | Age: 58
End: 2023-01-12
Attending: INTERNAL MEDICINE
Payer: COMMERCIAL

## 2023-01-12 ENCOUNTER — OFFICE VISIT (OUTPATIENT)
Dept: INTERNAL MEDICINE | Facility: CLINIC | Age: 58
End: 2023-01-12
Payer: COMMERCIAL

## 2023-01-12 VITALS
DIASTOLIC BLOOD PRESSURE: 66 MMHG | OXYGEN SATURATION: 97 % | WEIGHT: 161 LBS | HEART RATE: 82 BPM | TEMPERATURE: 97.9 F | RESPIRATION RATE: 16 BRPM | HEIGHT: 65 IN | SYSTOLIC BLOOD PRESSURE: 102 MMHG | BODY MASS INDEX: 26.82 KG/M2

## 2023-01-12 DIAGNOSIS — M25.552 HIP PAIN, LEFT: ICD-10-CM

## 2023-01-12 DIAGNOSIS — M25.562 ACUTE PAIN OF LEFT KNEE: ICD-10-CM

## 2023-01-12 DIAGNOSIS — M25.562 ACUTE PAIN OF LEFT KNEE: Primary | ICD-10-CM

## 2023-01-12 PROCEDURE — 73562 X-RAY EXAM OF KNEE 3: CPT | Mod: TC | Performed by: RADIOLOGY

## 2023-01-12 PROCEDURE — 99214 OFFICE O/P EST MOD 30 MIN: CPT | Performed by: INTERNAL MEDICINE

## 2023-01-12 PROCEDURE — 73502 X-RAY EXAM HIP UNI 2-3 VIEWS: CPT | Mod: TC | Performed by: INTERNAL MEDICINE

## 2023-01-12 ASSESSMENT — ENCOUNTER SYMPTOMS
RESPIRATORY NEGATIVE: 1
GASTROINTESTINAL NEGATIVE: 1
CONSTITUTIONAL NEGATIVE: 1
ARTHRALGIAS: 1
CARDIOVASCULAR NEGATIVE: 1

## 2023-01-12 ASSESSMENT — ASTHMA QUESTIONNAIRES
QUESTION_2 LAST FOUR WEEKS HOW OFTEN HAVE YOU HAD SHORTNESS OF BREATH: NOT AT ALL
ACT_TOTALSCORE: 25
QUESTION_5 LAST FOUR WEEKS HOW WOULD YOU RATE YOUR ASTHMA CONTROL: COMPLETELY CONTROLLED
QUESTION_1 LAST FOUR WEEKS HOW MUCH OF THE TIME DID YOUR ASTHMA KEEP YOU FROM GETTING AS MUCH DONE AT WORK, SCHOOL OR AT HOME: NONE OF THE TIME
QUESTION_3 LAST FOUR WEEKS HOW OFTEN DID YOUR ASTHMA SYMPTOMS (WHEEZING, COUGHING, SHORTNESS OF BREATH, CHEST TIGHTNESS OR PAIN) WAKE YOU UP AT NIGHT OR EARLIER THAN USUAL IN THE MORNING: NOT AT ALL
ACT_TOTALSCORE: 25
QUESTION_4 LAST FOUR WEEKS HOW OFTEN HAVE YOU USED YOUR RESCUE INHALER OR NEBULIZER MEDICATION (SUCH AS ALBUTEROL): NOT AT ALL

## 2023-01-12 NOTE — PROGRESS NOTES
"  Assessment & Plan     Acute pain of left knee and left hip pain  At this time, patient is noted to have pain associated with flexion of her left hip and knee.  Patient also had pain associated with external rotation of her left hip and valgus maneuvers of the left knee.  After much discussion, patient was agreeable to proceed with x-ray imaging of her left knee and hip as part of the initial steps in evaluation of her joints after her injury.  Images are currently pending.  In the meantime, patient will continue the use of naproxen on as-needed basis for management of her discomfort.  We did also discuss icing the affected joints.  Patient was encouraged to avoid excessive physical activity until the results of her images are available for review.  I did broach the possibility that additional imaging may be needed as the twisting nature of her injury could have caused some issues with the soft tissues of the affected joints.  Patient states her understanding, and she had no further questions or concerns at this time.  She will be contacted as soon as her imaging results are available for review.  - XR Knee Left 3 Views; Future  - XR Hip Left 2-3 Views; Future    Ordering of each unique test  30 minutes spent on the date of the encounter doing chart review, history and exam, documentation and further activities per the note       BMI:   Estimated body mass index is 27 kg/m  as calculated from the following:    Height as of this encounter: 1.645 m (5' 4.75\").    Weight as of this encounter: 73 kg (161 lb).       See Patient Instructions    Return in about 1 week (around 1/19/2023), or if symptoms worsen or fail to improve.    Vipin Toth MD  [facility] NAIF Liang is a 57 year old presenting for the following health issues:  Pain      Patient is a 57-year-old  female who presents to the clinic with chief complaint of knee and hip pain.  Patient states that on " the evening prior to presentation she did suffer an injury while playing broom ball.  Patient reports that she had slipped to the ground landing on her left knee, and while she was down another individual did fall across her forcing her body backwards and twisting her left knee and hip.  Patient states that she did have trouble getting up off the ground after the injury did occur.  Since that time, she has had persistent episodes of pain involving her left hip and knee.  Her pain is exacerbated by flexion of her left hip joint.  Patient does report that it is very uncomfortable for her to bear weight on her left knee and hip.  Patient has not noted any swelling in either joint.  She has been taking naproxen intermittently for discomfort, and it does provide her with some relief.  Patient has not had any issues with either joint locking or giving way.    History of Present Illness       Reason for visit:  Broomball injury to hip and knee  Symptom onset:  1-3 days ago    She eats 4 or more servings of fruits and vegetables daily.She consumes 0 sweetened beverage(s) daily.She exercises with enough effort to increase her heart rate 20 to 29 minutes per day.  She exercises with enough effort to increase her heart rate 5 days per week.   She is taking medications regularly.       Concern - left hip and knee pain   Onset: fell last night playing [employer]  Description: as above   Intensity: mild    Progression of Symptoms:  Slightly better from last night   Accompanying Signs & Symptoms: difficulty walking   Previous history of similar problem: no   Precipitating factors:        Worsened by: lifting knee / stairs   Alleviating factors:        Improved by: ice. aleve   Therapies tried and outcome: as above         Review of Systems   Constitutional: Negative.    HENT: Negative.    Respiratory: Negative.    Cardiovascular: Negative.    Gastrointestinal: Negative.    Genitourinary: Negative.    Musculoskeletal: Positive for  "arthralgias and gait problem.            Objective    Blood pressure 102/66, pulse 82, temperature 97.9  F (36.6  C), temperature source Oral, resp. rate 16, height 1.645 m (5' 4.75\"), weight 73 kg (161 lb), SpO2 97 %.      Physical Exam  Vitals reviewed.   HENT:      Head: Normocephalic and atraumatic.      Mouth/Throat:      Mouth: Mucous membranes are moist.      Pharynx: Oropharynx is clear.   Eyes:      Extraocular Movements: Extraocular movements intact.      Conjunctiva/sclera: Conjunctivae normal.      Pupils: Pupils are equal, round, and reactive to light.   Cardiovascular:      Rate and Rhythm: Normal rate and regular rhythm.      Pulses: Normal pulses.      Heart sounds: Normal heart sounds.   Pulmonary:      Effort: Pulmonary effort is normal.      Breath sounds: Normal breath sounds.   Musculoskeletal:      Left hip: Tenderness present. No deformity. Decreased range of motion.      Right knee: Normal.      Left knee: No swelling or crepitus. Decreased range of motion. Tenderness present over the medial joint line. Normal alignment.      Comments: Patient had noticeable tender to palpation along the medial joint line of her left knee.  She also had pain associated with valgus maneuvers involving her left knee.  Her left hip examination was notable for exquisite pain associated with external rotation.  Patient also had pain associated with flexion of her left hip.   Skin:     General: Skin is warm and dry.      Capillary Refill: Capillary refill takes less than 2 seconds.   Neurological:      Mental Status: She is alert.        Diagnostic Test Results: X-ray of left knee, 2-3 views, is pending.  X-ray of left hip, 2-3 views, is pending.        "

## 2023-01-12 NOTE — TELEPHONE ENCOUNTER
Patient Quality Outreach    Patient is due for the following:   Asthma  -  ACT needed  Colon Cancer Screening  Depression  -   phq 2 needed       Topic Date Due     Hepatitis B Vaccine (1 of 3 - 3-dose series) Never done     Zoster (Shingles) Vaccine (1 of 2) Never done     COVID-19 Vaccine (4 - Booster for Moderna series) 08/22/2022       Next Steps:   Patient has upcoming appointment, these items will be addressed at that time.    Type of outreach:    none       Questions for provider review:    None     Cyndee Hyatt LPN

## 2023-08-07 DIAGNOSIS — E03.9 ACQUIRED HYPOTHYROIDISM: ICD-10-CM

## 2023-08-09 NOTE — TELEPHONE ENCOUNTER
Routing refill request to provider for review/approval because:  Labs not current:  TSH  Patient needs to be seen because it has been more than 1 year since last office visit.    Jennifer MIRANDA RN, BSN, PHN  [facility]  [phone]

## 2023-08-10 RX ORDER — LEVOTHYROXINE SODIUM 112 UG/1
112 TABLET ORAL DAILY
Qty: 90 TABLET | Refills: 0 | Status: SHIPPED | OUTPATIENT
Start: 2023-08-10 | End: 2023-08-16

## 2023-08-10 NOTE — TELEPHONE ENCOUNTER
Patient due for TSH level; has scheduled appointment with PCP next week.     ROSENDA Chaudhary CNP

## 2023-08-11 ENCOUNTER — PATIENT OUTREACH (OUTPATIENT)
Dept: CARE COORDINATION | Facility: CLINIC | Age: 58
End: 2023-08-11
Payer: COMMERCIAL

## 2023-08-15 ASSESSMENT — ENCOUNTER SYMPTOMS
FREQUENCY: 0
PARESTHESIAS: 0
CHILLS: 0
MYALGIAS: 0
CONSTIPATION: 0
NAUSEA: 0
BREAST MASS: 0
HEADACHES: 0
DIARRHEA: 0
ARTHRALGIAS: 0
JOINT SWELLING: 0
EYE PAIN: 0
HEMATURIA: 0
COUGH: 0
SORE THROAT: 0
HEMATOCHEZIA: 0
ABDOMINAL PAIN: 0
HEARTBURN: 0
WEAKNESS: 0
FEVER: 0
SHORTNESS OF BREATH: 0
PALPITATIONS: 0
DIZZINESS: 0
NERVOUS/ANXIOUS: 0
DYSURIA: 0

## 2023-08-15 ASSESSMENT — ASTHMA QUESTIONNAIRES: ACT_TOTALSCORE: 25

## 2023-08-16 ENCOUNTER — OFFICE VISIT (OUTPATIENT)
Dept: FAMILY MEDICINE | Facility: CLINIC | Age: 58
End: 2023-08-16
Payer: COMMERCIAL

## 2023-08-16 ENCOUNTER — LAB (OUTPATIENT)
Dept: FAMILY MEDICINE | Facility: CLINIC | Age: 58
End: 2023-08-16

## 2023-08-16 VITALS
TEMPERATURE: 97.8 F | HEIGHT: 65 IN | BODY MASS INDEX: 27.49 KG/M2 | HEART RATE: 70 BPM | OXYGEN SATURATION: 98 % | WEIGHT: 165 LBS | RESPIRATION RATE: 16 BRPM | DIASTOLIC BLOOD PRESSURE: 58 MMHG | SYSTOLIC BLOOD PRESSURE: 122 MMHG

## 2023-08-16 DIAGNOSIS — Z12.11 SCREEN FOR COLON CANCER: ICD-10-CM

## 2023-08-16 DIAGNOSIS — Z12.31 VISIT FOR SCREENING MAMMOGRAM: ICD-10-CM

## 2023-08-16 DIAGNOSIS — E03.8 OTHER SPECIFIED HYPOTHYROIDISM: ICD-10-CM

## 2023-08-16 DIAGNOSIS — Z00.00 ROUTINE GENERAL MEDICAL EXAMINATION AT A HEALTH CARE FACILITY: Primary | ICD-10-CM

## 2023-08-16 LAB
ANION GAP SERPL CALCULATED.3IONS-SCNC: 11 MMOL/L (ref 7–15)
BUN SERPL-MCNC: 11.7 MG/DL (ref 6–20)
CALCIUM SERPL-MCNC: 9.2 MG/DL (ref 8.6–10)
CHLORIDE SERPL-SCNC: 106 MMOL/L (ref 98–107)
CREAT SERPL-MCNC: 0.83 MG/DL (ref 0.51–0.95)
DEPRECATED HCO3 PLAS-SCNC: 26 MMOL/L (ref 22–29)
ERYTHROCYTE [DISTWIDTH] IN BLOOD BY AUTOMATED COUNT: 12.1 % (ref 10–15)
GFR SERPL CREATININE-BSD FRML MDRD: 82 ML/MIN/1.73M2
GLUCOSE SERPL-MCNC: 85 MG/DL (ref 70–99)
HCT VFR BLD AUTO: 38.3 % (ref 35–47)
HGB BLD-MCNC: 12.6 G/DL (ref 11.7–15.7)
MCH RBC QN AUTO: 30.9 PG (ref 26.5–33)
MCHC RBC AUTO-ENTMCNC: 32.9 G/DL (ref 31.5–36.5)
MCV RBC AUTO: 94 FL (ref 78–100)
PLATELET # BLD AUTO: 213 10E3/UL (ref 150–450)
POTASSIUM SERPL-SCNC: 4.1 MMOL/L (ref 3.4–5.3)
RBC # BLD AUTO: 4.08 10E6/UL (ref 3.8–5.2)
SODIUM SERPL-SCNC: 143 MMOL/L (ref 136–145)
TSH SERPL DL<=0.005 MIU/L-ACNC: 1.26 UIU/ML (ref 0.3–4.2)
WBC # BLD AUTO: 5.4 10E3/UL (ref 4–11)

## 2023-08-16 PROCEDURE — 80048 BASIC METABOLIC PNL TOTAL CA: CPT | Performed by: PHYSICIAN ASSISTANT

## 2023-08-16 PROCEDURE — 85027 COMPLETE CBC AUTOMATED: CPT | Performed by: PHYSICIAN ASSISTANT

## 2023-08-16 PROCEDURE — 36415 COLL VENOUS BLD VENIPUNCTURE: CPT | Performed by: PHYSICIAN ASSISTANT

## 2023-08-16 PROCEDURE — 99396 PREV VISIT EST AGE 40-64: CPT | Performed by: PHYSICIAN ASSISTANT

## 2023-08-16 PROCEDURE — 84443 ASSAY THYROID STIM HORMONE: CPT | Performed by: PHYSICIAN ASSISTANT

## 2023-08-16 RX ORDER — LEVOTHYROXINE SODIUM 112 UG/1
112 TABLET ORAL DAILY
Qty: 90 TABLET | Refills: 0 | Status: SHIPPED | OUTPATIENT
Start: 2023-08-16 | End: 2023-11-14

## 2023-08-16 ASSESSMENT — ENCOUNTER SYMPTOMS
DIARRHEA: 0
HEARTBURN: 0
CHILLS: 0
PARESTHESIAS: 0
NERVOUS/ANXIOUS: 0
SORE THROAT: 0
HEADACHES: 0
WEAKNESS: 0
HEMATURIA: 0
COUGH: 0
HEMATOCHEZIA: 0
JOINT SWELLING: 0
DYSURIA: 0
MYALGIAS: 0
DIZZINESS: 0
SHORTNESS OF BREATH: 0
FEVER: 0
CONSTIPATION: 0
NAUSEA: 0
BREAST MASS: 0
ARTHRALGIAS: 0
FREQUENCY: 0
PALPITATIONS: 0
EYE PAIN: 0
ABDOMINAL PAIN: 0

## 2023-08-16 ASSESSMENT — PAIN SCALES - GENERAL: PAINLEVEL: NO PAIN (0)

## 2023-08-16 NOTE — PROGRESS NOTES
SUBJECTIVE:   CC: Azul is an 57 year old who presents for preventive health visit.       2023     8:01 AM   Additional Questions   Roomed by Elizabeth   Accompanied by self         2023     8:01 AM   Patient Reported Additional Medications   Patient reports taking the following new medications none       Healthy Habits:     Getting at least 3 servings of Calcium per day:  Yes    Bi-annual eye exam:  Yes    Dental care twice a year:  Yes    Sleep apnea or symptoms of sleep apnea:  None    Diet:  Regular (no restrictions)    Frequency of exercise:  6-7 days/week    Duration of exercise:  15-30 minutes    Taking medications regularly:  Yes    Medication side effects:  None    Additional concerns today:  No      Patient here for a physical today.  HS  at [facility]    No concerns, feeling well.    Hypothyroidism Follow-up    Since last visit, patient describes the following symptoms: Weight stable, no hair loss, no skin changes, no constipation, no loose stools      Social History     Tobacco Use    Smoking status: Never    Smokeless tobacco: Never   Substance Use Topics    Alcohol use: Yes             8/15/2023     9:41 AM   Alcohol Use   Prescreen: >3 drinks/day or >7 drinks/week? No     Reviewed orders with patient.  Reviewed health maintenance and updated orders accordingly - Yes  Lab work is in process    Breast Cancer Screenin/22/2022     7:38 AM   Breast CA Risk Assessment (FHS-7)   Do you have a family history of breast, colon, or ovarian cancer? No / Unknown       click delete button to remove this line now  Mammogram Screening: Recommended mammography every 1-2 years with patient discussion and risk factor consideration  Pertinent mammograms are reviewed under the imaging tab.    History of abnormal Pap smear: NO - age 30-65 PAP every 5 years with negative HPV co-testing recommended     Reviewed and updated as needed this visit by clinical staff   Tobacco  Allergies  Meds        "       Reviewed and updated as needed this visit by Provider                     Review of Systems   Constitutional:  Negative for chills and fever.   HENT:  Negative for congestion, ear pain, hearing loss and sore throat.    Eyes:  Negative for pain and visual disturbance.   Respiratory:  Negative for cough and shortness of breath.    Cardiovascular:  Negative for chest pain, palpitations and peripheral edema.   Gastrointestinal:  Negative for abdominal pain, constipation, diarrhea, heartburn, hematochezia and nausea.   Breasts:  Negative for tenderness, breast mass and discharge.   Genitourinary:  Negative for dysuria, frequency, genital sores, hematuria, pelvic pain, urgency, vaginal bleeding and vaginal discharge.   Musculoskeletal:  Negative for arthralgias, joint swelling and myalgias.   Skin:  Negative for rash.   Neurological:  Negative for dizziness, weakness, headaches and paresthesias.   Psychiatric/Behavioral:  Negative for mood changes. The patient is not nervous/anxious.           OBJECTIVE:   /58 (BP Location: Right arm, Patient Position: Sitting, Cuff Size: Adult Regular)   Pulse 70   Temp 97.8  F (36.6  C) (Oral)   Resp 16   Ht 1.645 m (5' 4.75\")   Wt 74.8 kg (165 lb)   LMP  (LMP Unknown)   SpO2 98%   BMI 27.67 kg/m    Physical Exam  GENERAL: healthy, alert and no distress  EYES: Eyes grossly normal to inspection, PERRL and conjunctivae and sclerae normal  HENT: ear canals and TM's normal, nose and mouth without ulcers or lesions  NECK: no adenopathy, no asymmetry, masses, or scars and thyroid normal to palpation  RESP: lungs clear to auscultation - no rales, rhonchi or wheezes  BREAST: normal without masses, tenderness or nipple discharge and no palpable axillary masses or adenopathy  CV: regular rate and rhythm, normal S1 S2, no S3 or S4, no murmur, click or rub, no peripheral edema and peripheral pulses strong  ABDOMEN: soft, nontender, no hepatosplenomegaly, no masses and bowel " "sounds normal  MS: no gross musculoskeletal defects noted, no edema  SKIN: no suspicious lesions or rashes  NEURO: Normal strength and tone, mentation intact and speech normal  PSYCH: mentation appears normal, affect normal/bright    Diagnostic Test Results:  Labs reviewed in Epic    ASSESSMENT/PLAN:   1. Routine general medical examination at a health care facility  Not fasting today.  Lipids looked great in 2020- can repeat next year.  - Basic metabolic panel  (Ca, Cl, CO2, Creat, Gluc, K, Na, BUN); Future  - CBC with platelets; Future    2. Screen for colon cancer  Agrees to screening.  - COLOGUARD([employer]); Future    3. Other specified hypothyroidism  Lab needed.  Did get a refill last week, can refill more when needed.  - TSH WITH FREE T4 REFLEX; Future    4. Visit for screening mammogram  Due for screening.  - MA SCREENING DIGITAL BILAT - Future  (s+30); Future            COUNSELING:  Reviewed preventive health counseling, as reflected in patient instructions       Regular exercise       Healthy diet/nutrition       Colorectal Cancer Screening      BMI:   Estimated body mass index is 27.67 kg/m  as calculated from the following:    Height as of this encounter: 1.645 m (5' 4.75\").    Weight as of this encounter: 74.8 kg (165 lb).   Weight management plan: Discussed healthy diet and exercise guidelines      She reports that she has never smoked. She has never used smokeless tobacco.          Nando Honeycutt PA-C  [facility]  "

## 2023-08-26 LAB — NONINV COLON CA DNA+OCC BLD SCRN STL QL: NEGATIVE

## 2023-10-06 ENCOUNTER — ANCILLARY PROCEDURE (OUTPATIENT)
Dept: MAMMOGRAPHY | Facility: CLINIC | Age: 58
End: 2023-10-06
Attending: PHYSICIAN ASSISTANT
Payer: COMMERCIAL

## 2023-10-06 ENCOUNTER — ALLIED HEALTH/NURSE VISIT (OUTPATIENT)
Dept: FAMILY MEDICINE | Facility: CLINIC | Age: 58
End: 2023-10-06
Payer: COMMERCIAL

## 2023-10-06 DIAGNOSIS — Z23 NEED FOR SHINGLES VACCINE: Primary | ICD-10-CM

## 2023-10-06 DIAGNOSIS — Z12.31 VISIT FOR SCREENING MAMMOGRAM: ICD-10-CM

## 2023-10-06 PROCEDURE — 77067 SCR MAMMO BI INCL CAD: CPT | Mod: TC | Performed by: RADIOLOGY

## 2023-10-06 PROCEDURE — 90471 IMMUNIZATION ADMIN: CPT

## 2023-10-06 PROCEDURE — 90750 HZV VACC RECOMBINANT IM: CPT

## 2023-10-06 NOTE — PROGRESS NOTES
Prior to immunization administration, verified patients identity using patient s name and date of birth. Please see Immunization Activity for additional information.     Screening Questionnaire for Adult Immunization    Are you sick today?   No   Do you have allergies to medications, food, a vaccine component or latex?   sulfa   Have you ever had a serious reaction after receiving a vaccination?   No   Do you have a long-term health problem with heart, lung, kidney, or metabolic disease (e.g., diabetes), asthma, a blood disorder, no spleen, complement component deficiency, a cochlear implant, or a spinal fluid leak?  Are you on long-term aspirin therapy?   No   Do you have cancer, leukemia, HIV/AIDS, or any other immune system problem?   No   Do you have a parent, brother, or sister with an immune system problem?   No   In the past 3 months, have you taken medications that affect  your immune system, such as prednisone, other steroids, or anticancer drugs; drugs for the treatment of rheumatoid arthritis, Crohn s disease, or psoriasis; or have you had radiation treatments?   No   Have you had a seizure, or a brain or other nervous system problem?   No   During the past year, have you received a transfusion of blood or blood    products, or been given immune (gamma) globulin or antiviral drug?   No   For women: Are you pregnant or is there a chance you could become       pregnant during the next month?   No   Have you received any vaccinations in the past 4 weeks?   No         I have reviewed the following standing orders:   This patient is due and qualifies for the Zoster vaccine.    Click here for Zoster Standing Order    I have reviewed the vaccines inclusion and exclusion criteria; No concerns regarding eligibility.     Patient instructed to remain in clinic for 15 minutes afterwards, and to report any adverse reactions.     Screening performed by Kennedi Zuniga on 10/6/2023 at 3:29 PM.

## 2023-11-14 ENCOUNTER — MYC REFILL (OUTPATIENT)
Dept: FAMILY MEDICINE | Facility: CLINIC | Age: 58
End: 2023-11-14
Payer: COMMERCIAL

## 2023-11-14 DIAGNOSIS — E03.9 ACQUIRED HYPOTHYROIDISM: Primary | ICD-10-CM

## 2023-11-15 RX ORDER — LEVOTHYROXINE SODIUM 112 UG/1
112 TABLET ORAL DAILY
Qty: 90 TABLET | Refills: 0 | Status: SHIPPED | OUTPATIENT
Start: 2023-11-15 | End: 2024-02-12

## 2024-02-09 ENCOUNTER — ALLIED HEALTH/NURSE VISIT (OUTPATIENT)
Dept: FAMILY MEDICINE | Facility: CLINIC | Age: 59
End: 2024-02-09
Payer: COMMERCIAL

## 2024-02-09 DIAGNOSIS — Z23 NEED FOR ZOSTER VACCINATION: Primary | ICD-10-CM

## 2024-02-09 PROCEDURE — 90750 HZV VACC RECOMBINANT IM: CPT

## 2024-02-09 PROCEDURE — 90471 IMMUNIZATION ADMIN: CPT

## 2024-02-12 ENCOUNTER — MYC MEDICAL ADVICE (OUTPATIENT)
Dept: FAMILY MEDICINE | Facility: CLINIC | Age: 59
End: 2024-02-12
Payer: COMMERCIAL

## 2024-02-12 DIAGNOSIS — E03.9 ACQUIRED HYPOTHYROIDISM: ICD-10-CM

## 2024-02-12 RX ORDER — LEVOTHYROXINE SODIUM 112 UG/1
112 TABLET ORAL DAILY
Qty: 90 TABLET | Refills: 0 | Status: SHIPPED | OUTPATIENT
Start: 2024-02-12 | End: 2024-05-13

## 2024-05-12 DIAGNOSIS — E03.9 ACQUIRED HYPOTHYROIDISM: ICD-10-CM

## 2024-05-13 RX ORDER — LEVOTHYROXINE SODIUM 112 UG/1
112 TABLET ORAL DAILY
Qty: 90 TABLET | Refills: 0 | Status: SHIPPED | OUTPATIENT
Start: 2024-05-13 | End: 2024-08-07

## 2024-08-07 DIAGNOSIS — E03.9 ACQUIRED HYPOTHYROIDISM: ICD-10-CM

## 2024-08-07 RX ORDER — LEVOTHYROXINE SODIUM 112 UG/1
TABLET ORAL
Qty: 90 TABLET | Refills: 0 | Status: SHIPPED | OUTPATIENT
Start: 2024-08-07 | End: 2024-08-19

## 2024-08-15 SDOH — HEALTH STABILITY: PHYSICAL HEALTH: ON AVERAGE, HOW MANY DAYS PER WEEK DO YOU ENGAGE IN MODERATE TO STRENUOUS EXERCISE (LIKE A BRISK WALK)?: 5 DAYS

## 2024-08-15 SDOH — HEALTH STABILITY: PHYSICAL HEALTH: ON AVERAGE, HOW MANY MINUTES DO YOU ENGAGE IN EXERCISE AT THIS LEVEL?: 30 MIN

## 2024-08-15 ASSESSMENT — SOCIAL DETERMINANTS OF HEALTH (SDOH): HOW OFTEN DO YOU GET TOGETHER WITH FRIENDS OR RELATIVES?: ONCE A WEEK

## 2024-08-16 ASSESSMENT — ASTHMA QUESTIONNAIRES
ACT_TOTALSCORE: 25
QUESTION_5 LAST FOUR WEEKS HOW WOULD YOU RATE YOUR ASTHMA CONTROL: COMPLETELY CONTROLLED
QUESTION_3 LAST FOUR WEEKS HOW OFTEN DID YOUR ASTHMA SYMPTOMS (WHEEZING, COUGHING, SHORTNESS OF BREATH, CHEST TIGHTNESS OR PAIN) WAKE YOU UP AT NIGHT OR EARLIER THAN USUAL IN THE MORNING: NOT AT ALL
ACT_TOTALSCORE: 25
QUESTION_2 LAST FOUR WEEKS HOW OFTEN HAVE YOU HAD SHORTNESS OF BREATH: NOT AT ALL
QUESTION_4 LAST FOUR WEEKS HOW OFTEN HAVE YOU USED YOUR RESCUE INHALER OR NEBULIZER MEDICATION (SUCH AS ALBUTEROL): NOT AT ALL
QUESTION_1 LAST FOUR WEEKS HOW MUCH OF THE TIME DID YOUR ASTHMA KEEP YOU FROM GETTING AS MUCH DONE AT WORK, SCHOOL OR AT HOME: NONE OF THE TIME

## 2024-08-19 ENCOUNTER — OFFICE VISIT (OUTPATIENT)
Dept: FAMILY MEDICINE | Facility: CLINIC | Age: 59
End: 2024-08-19
Attending: PHYSICIAN ASSISTANT
Payer: COMMERCIAL

## 2024-08-19 VITALS
RESPIRATION RATE: 20 BRPM | HEART RATE: 64 BPM | DIASTOLIC BLOOD PRESSURE: 84 MMHG | HEIGHT: 65 IN | SYSTOLIC BLOOD PRESSURE: 137 MMHG | TEMPERATURE: 97.8 F | OXYGEN SATURATION: 96 % | BODY MASS INDEX: 28.47 KG/M2 | WEIGHT: 170.9 LBS

## 2024-08-19 DIAGNOSIS — Z12.4 CERVICAL CANCER SCREENING: ICD-10-CM

## 2024-08-19 DIAGNOSIS — E03.8 OTHER SPECIFIED HYPOTHYROIDISM: ICD-10-CM

## 2024-08-19 DIAGNOSIS — Z00.00 ROUTINE GENERAL MEDICAL EXAMINATION AT A HEALTH CARE FACILITY: Primary | ICD-10-CM

## 2024-08-19 LAB
CHOLEST SERPL-MCNC: 168 MG/DL
FASTING STATUS PATIENT QL REPORTED: YES
FASTING STATUS PATIENT QL REPORTED: YES
GLUCOSE SERPL-MCNC: 96 MG/DL (ref 70–99)
HDLC SERPL-MCNC: 76 MG/DL
LDLC SERPL CALC-MCNC: 78 MG/DL
NONHDLC SERPL-MCNC: 92 MG/DL
TRIGL SERPL-MCNC: 68 MG/DL
TSH SERPL DL<=0.005 MIU/L-ACNC: 2.38 UIU/ML (ref 0.3–4.2)

## 2024-08-19 PROCEDURE — 99396 PREV VISIT EST AGE 40-64: CPT | Performed by: PHYSICIAN ASSISTANT

## 2024-08-19 PROCEDURE — 80061 LIPID PANEL: CPT | Performed by: PHYSICIAN ASSISTANT

## 2024-08-19 PROCEDURE — 84443 ASSAY THYROID STIM HORMONE: CPT | Performed by: PHYSICIAN ASSISTANT

## 2024-08-19 PROCEDURE — 36415 COLL VENOUS BLD VENIPUNCTURE: CPT | Performed by: PHYSICIAN ASSISTANT

## 2024-08-19 PROCEDURE — 87624 HPV HI-RISK TYP POOLED RSLT: CPT | Performed by: PHYSICIAN ASSISTANT

## 2024-08-19 PROCEDURE — 99213 OFFICE O/P EST LOW 20 MIN: CPT | Mod: 25 | Performed by: PHYSICIAN ASSISTANT

## 2024-08-19 PROCEDURE — 82947 ASSAY GLUCOSE BLOOD QUANT: CPT | Performed by: PHYSICIAN ASSISTANT

## 2024-08-19 RX ORDER — LEVOTHYROXINE SODIUM 112 UG/1
112 TABLET ORAL DAILY
Qty: 90 TABLET | Refills: 3 | Status: SHIPPED | OUTPATIENT
Start: 2024-08-19

## 2024-08-19 ASSESSMENT — PAIN SCALES - GENERAL: PAINLEVEL: NO PAIN (0)

## 2024-08-19 NOTE — NURSING NOTE
"Chief Complaint   Patient presents with    Physical     And Pap    Blood Draw     Fasting labs.     Initial /84 (BP Location: Right arm, Patient Position: Sitting, Cuff Size: Adult Regular)   Pulse 64   Temp 97.8  F (36.6  C) (Oral)   Resp 20   Ht 1.645 m (5' 4.75\")   Wt 77.5 kg (170 lb 14.4 oz)   LMP  (LMP Unknown)   SpO2 96%   BMI 28.66 kg/m   Estimated body mass index is 28.66 kg/m  as calculated from the following:    Height as of this encounter: 1.645 m (5' 4.75\").    Weight as of this encounter: 77.5 kg (170 lb 14.4 oz).  BP completed using cuff size regular right arm    Lisa Magill, CMA    "

## 2024-08-19 NOTE — PATIENT INSTRUCTIONS
Patient Education   Preventive Care Advice   This is general advice given by our system to help you stay healthy. However, your care team may have specific advice just for you. Please talk to your care team about your preventive care needs.  Nutrition  Eat 5 or more servings of fruits and vegetables each day.  Try wheat bread, brown rice and whole grain pasta (instead of white bread, rice, and pasta).  Get enough calcium and vitamin D. Check the label on foods and aim for 100% of the RDA (recommended daily allowance).  Lifestyle  Exercise at least 150 minutes each week  (30 minutes a day, 5 days a week).  Do muscle strengthening activities 2 days a week. These help control your weight and prevent disease.  No smoking.  Wear sunscreen to prevent skin cancer.  Have a dental exam and cleaning every 6 months.  Yearly exams  See your health care team every year to talk about:  Any changes in your health.  Any medicines your care team has prescribed.  Preventive care, family planning, and ways to prevent chronic diseases.  Shots (vaccines)   HPV shots (up to age 26), if you've never had them before.  Hepatitis B shots (up to age 59), if you've never had them before.  COVID-19 shot: Get this shot when it's due.  Flu shot: Get a flu shot every year.  Tetanus shot: Get a tetanus shot every 10 years.  Pneumococcal, hepatitis A, and RSV shots: Ask your care team if you need these based on your risk.  Shingles shot (for age 50 and up)  General health tests  Diabetes screening:  Starting at age 35, Get screened for diabetes at least every 3 years.  If you are younger than age 35, ask your care team if you should be screened for diabetes.  Cholesterol test: At age 39, start having a cholesterol test every 5 years, or more often if advised.  Bone density scan (DEXA): At age 50, ask your care team if you should have this scan for osteoporosis (brittle bones).  Hepatitis C: Get tested at least once in your life.  STIs (sexually  transmitted infections)  Before age 24: Ask your care team if you should be screened for STIs.  After age 24: Get screened for STIs if you're at risk. You are at risk for STIs (including HIV) if:  You are sexually active with more than one person.  You don't use condoms every time.  You or a partner was diagnosed with a sexually transmitted infection.  If you are at risk for HIV, ask about PrEP medicine to prevent HIV.  Get tested for HIV at least once in your life, whether you are at risk for HIV or not.  Cancer screening tests  Cervical cancer screening: If you have a cervix, begin getting regular cervical cancer screening tests starting at age 21.  Breast cancer scan (mammogram): If you've ever had breasts, begin having regular mammograms starting at age 40. This is a scan to check for breast cancer.  Colon cancer screening: It is important to start screening for colon cancer at age 45.  Have a colonoscopy test every 10 years (or more often if you're at risk) Or, ask your provider about stool tests like a FIT test every year or Cologuard test every 3 years.  To learn more about your testing options, visit:   .  For help making a decision, visit:   https://bit.ly/nq80211.  Prostate cancer screening test: If you have a prostate, ask your care team if a prostate cancer screening test (PSA) at age 55 is right for you.  Lung cancer screening: If you are a current or former smoker ages 50 to 80, ask your care team if ongoing lung cancer screenings are right for you.  For informational purposes only. Not to replace the advice of your health care provider. Copyright   2023 [employer]. All rights reserved. Clinically reviewed by the [facility] Transitions Program. [employer] 923814 - REV 01/24.

## 2024-08-19 NOTE — PROGRESS NOTES
"Preventive Care Visit  [facility] SYLVIA Honeycutt PA-C, Family Medicine  Aug 19, 2024      Assessment & Plan     Routine general medical examination at a health care facility  Reviewed personal and family history. Reviewed age appropriate screenings. Reviewed healthy BP and BMI ranges. Counseled on lifestyle modifications for optimal mental and physical health.  Discussed age-appropriate health maintanence. Recommended any needed vaccinations. Continue to focus on well balanced diet and exercise   - Lipid panel reflex to direct LDL Fasting; Future  - Glucose; Future  - Lipid panel reflex to direct LDL Fasting  - Glucose    Cervical cancer screening    - Pap Screen with HPV - Recommended Age 30 - 65 Years    Other specified hypothyroidism  Refilling, check labs.  - TSH WITH FREE T4 REFLEX; Future  - levothyroxine (SYNTHROID/LEVOTHROID) 112 MCG tablet; Take 1 tablet (112 mcg) by mouth daily  - TSH WITH FREE T4 REFLEX    Patient has been advised of split billing requirements and indicates understanding: Yes        BMI  Estimated body mass index is 28.66 kg/m  as calculated from the following:    Height as of this encounter: 1.645 m (5' 4.75\").    Weight as of this encounter: 77.5 kg (170 lb 14.4 oz).   Weight management plan: Discussed healthy diet and exercise guidelines    Counseling  Appropriate preventive services were addressed with this patient via screening, questionnaire, or discussion as appropriate for fall prevention, nutrition, physical activity, Tobacco-use cessation, social engagement, weight loss and cognition.  Checklist reviewing preventive services available has been given to the patient.  Reviewed patient's diet, addressing concerns and/or questions.   She is at risk for psychosocial distress and has been provided with information to reduce risk.           Sirena Liang is a 58 year old, presenting for the following:  Physical (And Pap) and Blood Draw (Fasting " labs.)        8/19/2024    10:14 AM   Additional Questions   Roomed by Lisa Magill, CMA   Accompanied by self         8/19/2024    10:14 AM   Patient Reported Additional Medications   Patient reports taking the following new medications NONE        Health Care Directive  Patient does not have a Health Care Directive or Living Will: Advance Directive received and scanned. Click on Code in the patient header to view.    HPI  Thyroid medication refill for 1 year     No concerns, here for annual physical.  Fasting.    Hypothyroidism Follow-up    Since last visit, patient describes the following symptoms: Weight stable, no hair loss, no skin changes, no constipation, no loose stools        8/15/2024   General Health   How would you rate your overall physical health? Excellent   Feel stress (tense, anxious, or unable to sleep) Only a little      (!) STRESS CONCERN      8/15/2024   Nutrition   Three or more servings of calcium each day? Yes   Diet: Regular (no restrictions)   How many servings of fruit and vegetables per day? (!) 2-3   How many sweetened beverages each day? 0-1            8/15/2024   Exercise   Days per week of moderate/strenous exercise 5 days   Average minutes spent exercising at this level 30 min            8/15/2024   Social Factors   Frequency of gathering with friends or relatives Once a week   Worry food won't last until get money to buy more No   Food not last or not have enough money for food? No   Do you have housing? (Housing is defined as stable permanent housing and does not include staying ouside in a car, in a tent, in an abandoned building, in an overnight shelter, or couch-surfing.) Yes   Are you worried about losing your housing? No   Lack of transportation? No   Unable to get utilities (heat,electricity)? No            8/15/2024   Fall Risk   Fallen 2 or more times in the past year? No   Trouble with walking or balance? No             8/15/2024   Dental   Dentist two times every year?  Yes            8/15/2024   TB Screening   Were you born outside of the [place]? No            Today's PHQ-2 Score:       8/19/2024    10:07 AM   PHQ-2 ( 1999 [employer])   Q1: Little interest or pleasure in doing things 0   Q2: Feeling down, depressed or hopeless 0   PHQ-2 Score 0   Q1: Little interest or pleasure in doing things Not at all   Q2: Feeling down, depressed or hopeless Not at all   PHQ-2 Score 0           8/15/2024   Substance Use   Alcohol more than 3/day or more than 7/wk No   Do you use any other substances recreationally? No        Social History     Tobacco Use    Smoking status: Never    Smokeless tobacco: Never   Vaping Use    Vaping status: Never Used   Substance Use Topics    Alcohol use: Yes    Drug use: Never           10/6/2023   LAST FHS-7 RESULTS   1st degree relative breast or ovarian cancer No   Any relative bilateral breast cancer No   Any male have breast cancer No   Any ONE woman have BOTH breast AND ovarian cancer No   Any woman with breast cancer before 50yrs No   2 or more relatives with breast AND/OR ovarian cancer No   2 or more relatives with breast AND/OR bowel cancer No           Mammogram Screening - Mammogram every 1-2 years updated in Health Maintenance based on mutual decision making        8/15/2024   STI Screening   New sexual partner(s) since last STI/HIV test? No        History of abnormal Pap smear: No - age 30- 64 PAP with HPV every 5 years recommended        7/24/2019    12:00 AM   PAP / HPV   PAP-ABSTRACT See Scanned Document           This result is from an external source.     ASCVD Risk   The 10-year ASCVD risk score (Harry PHILLIPS, et al., 2019) is: 1.7%    Values used to calculate the score:      Age: 58 years      Sex: Female      Is Non- : No      Diabetic: No      Tobacco smoker: No      Systolic Blood Pressure: 137 mmHg      Is BP treated: No      HDL Cholesterol: 68 mg/dL      Total Cholesterol: 146 mg/dL           Reviewed and updated  "as needed this visit by Provider                        Review of Systems  Constitutional, HEENT, cardiovascular, pulmonary, gi and gu systems are negative, except as otherwise noted.     Objective    Exam  /84 (BP Location: Right arm, Patient Position: Sitting, Cuff Size: Adult Regular)   Pulse 64   Temp 97.8  F (36.6  C) (Oral)   Resp 20   Ht 1.645 m (5' 4.75\")   Wt 77.5 kg (170 lb 14.4 oz)   LMP  (LMP Unknown)   SpO2 96%   BMI 28.66 kg/m     Estimated body mass index is 28.66 kg/m  as calculated from the following:    Height as of this encounter: 1.645 m (5' 4.75\").    Weight as of this encounter: 77.5 kg (170 lb 14.4 oz).    Physical Exam  GENERAL: alert and no distress  EYES: Eyes grossly normal to inspection, PERRL and conjunctivae and sclerae normal  HENT: ear canals and TM's normal, nose and mouth without ulcers or lesions  NECK: no adenopathy, no asymmetry, masses, or scars  RESP: lungs clear to auscultation - no rales, rhonchi or wheezes  CV: regular rate and rhythm, normal S1 S2, no S3 or S4, no murmur, click or rub, no peripheral edema  ABDOMEN: soft, nontender, no hepatosplenomegaly, no masses and bowel sounds normal  MS: no gross musculoskeletal defects noted, no edema  SKIN: no suspicious lesions or rashes  NEURO: Normal strength and tone, mentation intact and speech normal  PSYCH: mentation appears normal, affect normal/bright        Signed Electronically by: Nando Honeycutt PA-C    "

## 2024-08-26 LAB
HPV HR 12 DNA CVX QL NAA+PROBE: NORMAL
HPV16 DNA CVX QL NAA+PROBE: NORMAL
HPV18 DNA CVX QL NAA+PROBE: NORMAL
HUMAN PAPILLOMA VIRUS FINAL DIAGNOSIS: NORMAL

## 2024-11-09 ENCOUNTER — MYC MEDICAL ADVICE (OUTPATIENT)
Dept: FAMILY MEDICINE | Facility: CLINIC | Age: 59
End: 2024-11-09
Payer: COMMERCIAL